# Patient Record
Sex: MALE | Race: WHITE
[De-identification: names, ages, dates, MRNs, and addresses within clinical notes are randomized per-mention and may not be internally consistent; named-entity substitution may affect disease eponyms.]

---

## 2017-12-13 ENCOUNTER — HOSPITAL ENCOUNTER (OUTPATIENT)
Dept: HOSPITAL 92 - CT | Age: 72
Discharge: HOME | End: 2017-12-13
Attending: INTERNAL MEDICINE
Payer: MEDICARE

## 2017-12-13 DIAGNOSIS — R91.1: Primary | ICD-10-CM

## 2017-12-13 DIAGNOSIS — J98.4: ICD-10-CM

## 2017-12-13 DIAGNOSIS — R91.8: ICD-10-CM

## 2017-12-13 DIAGNOSIS — J44.9: ICD-10-CM

## 2017-12-13 DIAGNOSIS — N20.0: ICD-10-CM

## 2017-12-13 PROCEDURE — 71260 CT THORAX DX C+: CPT

## 2017-12-13 NOTE — CT
CT THORAX WITH IV CONTRAST:

 

INDICATIONS:

Follow-up pulmonary nodules and shortness of breath.

 

COMPARISON:

Chest radiograph dated 08/02/2017.

 

FINDINGS:

The patchy parenchymal opacity seen within the right upper lobe on the comparison examination likely 
corresponds to an area of bronchiectasis and surrounding air space consolidation.  This may be relate
d to prior therapy changes within the right upper lobe with surrounding atelectasis and scarring.  Th
ere is severe emphysema.  There is some pleural parenchymal scarring involving the left lung apex.  T
here are numerous hypodensities involving the liver, suspicious for multiple small cysts.  There is a
 2.1 cm cyst involving the superior pole of the left kidney.  There are small, nonobstructing renal c
alculi bilaterally, within the visualized aspects of the kidneys.  There are small renal hypodensitie
s bilaterally, too small to characterize, but statistically also likely reflective of cysts.  There i
s partial visualization of an endovascular stent within the abdominal aorta.  The pancreas and spleen
 appear within normal limits.  The compression abnormalities involving the T8, T12, and T11 vertebral
 levels are similar appearing.  There are compression abnormalities involving T5, T6, and T8, of unde
termined chronicity.  There is diffuse osteopenia.

 

IMPRESSION:

1.  The spiculated density within the right suprahilar region on comparison chest radiograph correspo
nds to an area of bronchiectasis and surrounding consolidation or subsegmental atelectasis.  This can
 be seen in patients with Mycobacterium avium-intracellulare type infections.  This can also be relat
ed to prior therapy changes within the right upper lobe.  Recommend correlation.

 

2.  Severe chronic obstructive pulmonary disease.

 

3.  Suspected hepatic and renal cysts.

 

4.  Bilateral nephrolithiasis.

 

5.  Age indeterminate compression abnormalities of T8, T6, and T5.

 

6.  Chronic appearing compression abnormalities of T11 and T12.

 

POS: [facility]

## 2018-11-19 ENCOUNTER — HOSPITAL ENCOUNTER (OUTPATIENT)
Dept: HOSPITAL 92 - RAD | Age: 73
Discharge: HOME | End: 2018-11-19
Attending: INTERNAL MEDICINE
Payer: MEDICARE

## 2018-11-19 DIAGNOSIS — R06.00: Primary | ICD-10-CM

## 2018-11-19 DIAGNOSIS — R91.8: ICD-10-CM

## 2018-11-19 PROCEDURE — 71046 X-RAY EXAM CHEST 2 VIEWS: CPT

## 2018-11-19 NOTE — RAD
CHEST FRONTAL AND LATERAL IMAGIN2018

 

HISTORY:

Shortness of breath.

 

COMPARISON:

2017

 

FINDINGS:

Since the 2017 exam, there is a new focal opacity in the left lung base, with blunting of the l
eft costophrenic angle and patchy opacity extending to the left costophrenic angle.  There is vague i
ncreased density in the right lung apex as well, likely on the basis of volume loss and pleural thick
ening, when correlated with a 2017 chest CT.  The focal opacity in the left lung base was not s
een on the 2017 chest CT.  Heart and mediastinal contours appear grossly unremarkable.

 

The lungs are hyperinflated with increased linear interstitial density, suggesting COPD.  Incompletel
y imaged postoperative hardware overlies the lumbar spine.

 

IMPRESSION:

New focal opacity noted in the left base suggests infectious pneumonitis or aspiration of left lower 
lobe.

 

Recommend follow-up imaging, following treatment, to document resolution.

 

POS: [facility]

## 2018-12-11 ENCOUNTER — HOSPITAL ENCOUNTER (OUTPATIENT)
Dept: HOSPITAL 18 - NAV CT | Age: 73
Discharge: HOME | End: 2018-12-11
Payer: MEDICARE

## 2018-12-11 DIAGNOSIS — K44.9: ICD-10-CM

## 2018-12-11 DIAGNOSIS — J44.9: ICD-10-CM

## 2018-12-11 DIAGNOSIS — J43.2: ICD-10-CM

## 2018-12-11 DIAGNOSIS — R91.8: ICD-10-CM

## 2018-12-11 DIAGNOSIS — R91.1: Primary | ICD-10-CM

## 2018-12-11 DIAGNOSIS — J47.9: ICD-10-CM

## 2018-12-11 PROCEDURE — 71250 CT THORAX DX C-: CPT

## 2018-12-11 NOTE — CT
CT THORAX NONCONTRAST:

 

DATE: 

12-11-18

 

HISTORY:

72-year-old male for follow up diagnosis of MAC (mycobacterium-avium complex)

 

COMPARISON:

12-13-17

 

FINDINGS:

There is no interval change in the size of the region of consolidation involving the posterior segmen
t of the right upper lobe, broadly abutting the posterior pleural surface, and almost reaching the ri
ght apex. There is extensive air bronchogram with bronchiectasis within this. What appears to be a sm
all cavity could instead represent an especially ectatic bronchus, as it has not significantly change
d. There has been no interval change overall involving this lesion. 

 

In the lateral basilar segment of the left lower lobe, there is a new somewhat spiculated, approximat
ely 3 x 1.5 x 2.5 cm soft tissue density pulmonary lesion. 

 

There is another new finding of herniation of approximately 15-20% volume of the stomach into the low
er mediastinum. 

 

There is a now greater degree of what are probably chronic lace-like subpleural densities involving t
he lingual, than before. There are diffuse centrilobular emphysematous changes throughout both lungs.
 Trachea and bilateral mainstem bronchi are patent and clear. No mediastinal lymphadenopathy. No thor
acic aortic aneurysm. No pleural effusion or pneumothorax. Multiple low attenuation lesions in the le
ft lobe of the liver are again noted, consistent with multiple hepatic cysts. 

 

IMPRESSION:

1. Consolidation with air bronchogram with bronchiectasis involving posterior segment of right upper 
lobe is unchanged in one year. 

2. New spiculated mass-like density at the lateral basilar segment of left lower lobe. This could eit
her present a new infectious lesion or primary neoplasm. Follow up chest CT is strongly recommended i
n 1-3 months. 

3. New small to moderate sized sliding hiatal hernia. 

4. Moderate to severe centrilobular emphysema. 

 

Code T

 

JN R

 

POS: [facility]

## 2019-04-18 ENCOUNTER — HOSPITAL ENCOUNTER (OUTPATIENT)
Dept: HOSPITAL 18 - NAV CT | Age: 74
Discharge: HOME | End: 2019-04-18
Payer: MEDICARE

## 2019-04-18 DIAGNOSIS — R91.8: ICD-10-CM

## 2019-04-18 DIAGNOSIS — J44.9: Primary | ICD-10-CM

## 2019-04-18 DIAGNOSIS — J47.9: ICD-10-CM

## 2019-04-18 PROCEDURE — 71250 CT THORAX DX C-: CPT

## 2019-04-18 NOTE — CT
CT Chest WO Con



HISTORY:COPD. Pulmonary infiltrates. Mycobacterium avium intracellulare.



COMPARISON: 12/11/2018 study



FINDINGS: Severe emphysematous lung changes are seen within the upper lobes. The parenchymal changes 
within the posterior segment of the right upper lobe with associated bronchiectatic change is a

stable finding.



The left lower lobe parenchymal changes have resolved..



The scarring within the region of the lingula is stable. There are no new infiltrative changes presen
t.



A large hiatal hernia is present. Coronary artery calcifications are noted.



There is stable appearance to the low attenuation lesions within the liver probably related to cysts.
 Small hypodensity within the right kidney is also probably a small cyst and stable renal

calcifications on the right are noted which appear to be renal calculi. Left-sided calcifications yolanda
ear to be vascular in nature.



IMPRESSION:

1. Severe centrilobular and ischemic changes

2. Stable appearance to the right upper lobe parenchymal change and associated traction bronchiectasi
s.

3. Interval resolution of the left lower lobe parenchymal lung changes..



Reported By: Nilson Beverly 

Electronically Signed:  4/18/2019 10:15 AM

## 2020-01-29 ENCOUNTER — HOSPITAL ENCOUNTER (EMERGENCY)
Dept: HOSPITAL 92 - ERS | Age: 75
Discharge: HOME | End: 2020-01-29
Payer: MEDICARE

## 2020-01-29 DIAGNOSIS — J18.9: ICD-10-CM

## 2020-01-29 DIAGNOSIS — Z87.891: ICD-10-CM

## 2020-01-29 DIAGNOSIS — J44.1: Primary | ICD-10-CM

## 2020-01-29 DIAGNOSIS — Z79.899: ICD-10-CM

## 2020-01-29 DIAGNOSIS — Z79.51: ICD-10-CM

## 2020-01-29 DIAGNOSIS — E78.00: ICD-10-CM

## 2020-01-29 LAB
ALBUMIN SERPL BCG-MCNC: 3.2 G/DL (ref 3.4–4.8)
ALP SERPL-CCNC: 86 U/L (ref 40–110)
ALT SERPL W P-5'-P-CCNC: 17 U/L (ref 8–55)
ANION GAP SERPL CALC-SCNC: 13 MMOL/L (ref 10–20)
AST SERPL-CCNC: 21 U/L (ref 5–34)
BASOPHILS # BLD AUTO: 0 THOU/UL (ref 0–0.2)
BASOPHILS NFR BLD AUTO: 0.2 % (ref 0–1)
BILIRUB SERPL-MCNC: 0.4 MG/DL (ref 0.2–1.2)
BUN SERPL-MCNC: 11 MG/DL (ref 8.4–25.7)
CALCIUM SERPL-MCNC: 8.8 MG/DL (ref 7.8–10.44)
CHLORIDE SERPL-SCNC: 103 MMOL/L (ref 98–107)
CO2 SERPL-SCNC: 23 MMOL/L (ref 23–31)
CREAT CL PREDICTED SERPL C-G-VRATE: 0 ML/MIN (ref 70–130)
EOSINOPHIL # BLD AUTO: 0.1 THOU/UL (ref 0–0.7)
EOSINOPHIL NFR BLD AUTO: 0.6 % (ref 0–10)
GLOBULIN SER CALC-MCNC: 2.9 G/DL (ref 2.4–3.5)
GLUCOSE SERPL-MCNC: 108 MG/DL (ref 83–110)
HGB BLD-MCNC: 9.1 G/DL (ref 14–18)
LYMPHOCYTES # BLD: 0.8 THOU/UL (ref 1.2–3.4)
LYMPHOCYTES NFR BLD AUTO: 9.3 % (ref 21–51)
MCH RBC QN AUTO: 27.3 PG (ref 27–31)
MCV RBC AUTO: 88.8 FL (ref 78–98)
MONOCYTES # BLD AUTO: 1.3 THOU/UL (ref 0.11–0.59)
MONOCYTES NFR BLD AUTO: 14.3 % (ref 0–10)
NEUTROPHILS # BLD AUTO: 6.8 THOU/UL (ref 1.4–6.5)
NEUTROPHILS NFR BLD AUTO: 75.6 % (ref 42–75)
PLATELET # BLD AUTO: 308 THOU/UL (ref 130–400)
POTASSIUM SERPL-SCNC: 4.4 MMOL/L (ref 3.5–5.1)
RBC # BLD AUTO: 3.34 MILL/UL (ref 4.7–6.1)
SODIUM SERPL-SCNC: 135 MMOL/L (ref 136–145)
WBC # BLD AUTO: 8.9 THOU/UL (ref 4.8–10.8)

## 2020-01-29 PROCEDURE — 94640 AIRWAY INHALATION TREATMENT: CPT

## 2020-01-29 PROCEDURE — 83880 ASSAY OF NATRIURETIC PEPTIDE: CPT

## 2020-01-29 PROCEDURE — 84484 ASSAY OF TROPONIN QUANT: CPT

## 2020-01-29 PROCEDURE — 96374 THER/PROPH/DIAG INJ IV PUSH: CPT

## 2020-01-29 PROCEDURE — 85025 COMPLETE CBC W/AUTO DIFF WBC: CPT

## 2020-01-29 PROCEDURE — 80053 COMPREHEN METABOLIC PANEL: CPT

## 2020-01-29 PROCEDURE — 36415 COLL VENOUS BLD VENIPUNCTURE: CPT

## 2020-01-29 PROCEDURE — 71260 CT THORAX DX C+: CPT

## 2020-01-29 PROCEDURE — 93005 ELECTROCARDIOGRAM TRACING: CPT

## 2020-01-29 PROCEDURE — 83605 ASSAY OF LACTIC ACID: CPT

## 2020-01-29 PROCEDURE — 71046 X-RAY EXAM CHEST 2 VIEWS: CPT

## 2020-01-29 NOTE — CT
EXAM: CT of the chest with contrast



HISTORY: Dyspnea. Recent pneumonia diagnosis



COMPARISON: 4/18/2019



TECHNIQUE: Multiple contiguous axial images were obtained in a CT the chest with contrast. Coronal an
d sagittal reformats were performed.



FINDINGS:



HEART: Normal in size without focal cardiac abnormality

MEDIASTINUM: No hilar or mediastinal lymphadenopathy. There is a large hiatal hernia.



LUNGS: There is collapse and consolidation of the left upper lobe. There are hypodense regions within
 the collapsed lung measuring up to 3.0 cm in size which may represent areas of cavitation.

Consolidation and bronchiectasis are seen in the right upper lobe. Emphysematous changes are seen thr
oughout the lungs.

PLEURAL SPACE: Small left pleural effusion.



CHEST WALL SOFT TISSUES: Unremarkable

OSSEOUS STRUCTURES: Degenerative changes in the spine. Postsurgical changes in the spine.

VISUALIZED SUBDIAPHRAGMATIC STRUCTURES: Scattered subcentimeter hypodensities in the liver likely rep
resent cysts. There are cysts in the bilateral kidneys measuring up to 2.4 cm in size. A

nonobstructing 3 mm calcification is seen in each kidney.



IMPRESSION: 



1. Left upper lobe consolidation with areas of cavitation

2. Stable right upper lobe bronchiectasis with surrounding atelectasis

3. Small left pleural effusion

4. Large hiatal hernia

5. Hepatic and renal cysts

6. Nonobstructing bilateral renal calcifications



Reported By: Sherif Marshall 

Electronically Signed:  1/29/2020 2:13 PM

## 2020-01-29 NOTE — RAD
EXAM:

Chest 2 views:



HISTORY:

Dyspnea.



COMPARISON:

CT chest 4/18/2019



FINDINGS:

There is a normal-sized cardiomediastinal silhouette. There is opacity in the left lung which may rep
resent an infiltrate. Opacity in the right apex may represent an infiltrate or pleural thickening.

There may be a small left pleural effusion.  Degenerative changes are seen in the spine.



IMPRESSION:

Multifocal pneumonia



Reported By: Sherif Marshall 

Electronically Signed:  1/29/2020 12:15 PM

## 2020-02-06 ENCOUNTER — HOSPITAL ENCOUNTER (OUTPATIENT)
Dept: HOSPITAL 92 - RAD | Age: 75
Discharge: HOME | End: 2020-02-06
Attending: INTERNAL MEDICINE
Payer: MEDICARE

## 2020-02-06 DIAGNOSIS — R06.00: Primary | ICD-10-CM

## 2020-02-06 PROCEDURE — 71046 X-RAY EXAM CHEST 2 VIEWS: CPT

## 2020-02-06 NOTE — RAD
PA AND LATERAL CHEST:

 

Date:  02/06/2020

 

HISTORY:  

Dyspnea. 

 

COMPARISON:  

CT examination of 01/29/2020 and chest x-ray of same date. 

 

FINDINGS:

The parenchymal lung changes appear similar to the prior examination with the consolidated lung coello
es noted within the lingula and the right upper lobe pleural and parenchymal lung changes all appear 
stable. Hiatal hernia is again noted. 

 

IMPRESSION: 

Stable exam. 

 

 

[facility]: [facility]

## 2020-02-11 NOTE — HP
This is going to be an encounter that should be with a bronchoscopy.  The

bronchoscopy is being scheduled for February 12, 2020. 



SERVICE:  Pulmonary Medicine.



REASON FOR VISIT:  Abnormal chest x-ray.



HISTORY OF PRESENT ILLNESS:  The patient is a very pleasant 74-year-old white male

with past medical history significant for severe kyphoscoliosis and compression

fractures of the vertebra.  He has in his usual state of health until recently he

suffered a bout of pneumonia.  He had some fevers and chills and was bringing up

sputum.  He has put on some antibiotics.  Repeat imaging demonstrated significant

improvement in the infiltrate, but it certainly did not go away.  Repeat imaging

performed a couple weeks later demonstrated persistence and changes prompting a CT

of the chest.  This showed complete atelectasis of the left upper lobe.  He denies

any current fevers, chills, nausea, vomiting, or diarrhea at this point.  He does

have more dyspnea on exertion than he experiences at baseline, but it is not severe.

 Because of this, he was sent back here. 



Previous imaging from about 8 months ago did not demonstrate any significant

problems here. 



PAST MEDICAL HISTORY:  

1. COPD.

2. Hypertension.

3. Dyslipidemia.

4. Rhinitis secondary to allergic and nonallergic causes.

5. Compressions fractions of the vertebra.

6. History of histoplasma of the right upper lobe, status post therapy, now with

chronic changes there. 

7. History of positive Mycobacterium avium complex sputum cultures, but never

received therapy historically. 

8. Chronic diastolic heart failure.



PAST SURGICAL HISTORY:  

1. Appendectomy.

2. Tonsillectomy.

3. Abdominal aortic aneurysm repair, endovascular.

4. Right inguinal hernia repair.



FAMILY HISTORY:  Positive for heart disease, COPD, and alcoholism.



SOCIAL HISTORY:  He drinks roughly 1 beer per day on average.  He has a 60-pack-year

history of smoking, though he is not currently doing this.  He quit well over 10

years ago.  He denies any street drugs and has no exposure to chemicals, dust,

asbestos, or tuberculosis. 



ALLERGIES:  PENICILLIN.



MEDICATIONS:  

1. Anoro Ellipta 62.5/25 one inhalation daily.

2. ProAir 90 mcg two puffs inhaled q.4 hours as needed (using roughly once per

month). 

3. Propranolol 20 mg p.o. b.i.d.

4. Zetia 10 mg p.o. daily.

5. Rosuvastatin 5 mg p.o. daily.

6. Tamsulosin 0.4 mg p.o. b.i.d.

7. Protonix 40 mg p.o. b.i.d.

8. Benadryl 25 mg p.o. as needed.

9. Multivitamin one tab p.o. daily.

10. Melatonin 3 mg p.o. at bedtime p.r.n.

11. Nasacort 2 puffs inhaled each nostril daily.

12. Tylenol 500 mg p.o. q.6 hours as needed (max 3 per day).

13. DuoNeb nebulized q.6 hours as needed.



REVIEW OF SYSTEMS:  General; head, ears, eyes, nose, throat; cardiovascular;

respiratory; GI; ; musculoskeletal; neurologic; and skin are negative except as

mentioned in the HPI. 



PHYSICAL EXAMINATION:

VITAL SIGNS:  Afebrile, pulse 67, respirations 19, saturation 98% on room air. 

GENERAL:  The patient is awake and alert, in no apparent distress. 

LUNGS:  Wonderful air entry.  Dependent crackles are minimal.  No prolonged

expiratory phase or wheezing appreciated. 

HEART:  Normal rate and regular. 

ABDOMEN:  Soft, nontender, and nondistended.  Bowel sounds are positive. 

MUSCULOSKELETAL:  No cyanosis or clubbing.  There is 1 to 2+ pitting in the

bilateral lower extremities. 

NEUROLOGIC:  Grossly nonfocal.



IMAGING DATA:  CT of the chest demonstrates complete atelectasis of the left upper

lobe.  There are chronic changes in the right upper lobe. 



ASSESSMENT:  

1. Chronic obstructive pulmonary disease/emphysema.

2. Restrictive lung disease secondary to severe kyphoscoliosis.

3. History of histoplasma of the right upper lobe, status post therapy, now with

chronic changes there including focal bronchiectasis. 

4. History of positive Mycobacterium avium complex by sputum culture, historically

never received therapy. 

5. Chronic diastolic heart failure.

6. Community-acquired pneumonia, recent with near improvement in the infiltrate.

7. Atelectasis of the entirety of the left upper lobe.



DISCUSSION AND PLAN:  On the CT scan, I do not see any discrete endobronchial

disease in the left upper lobe, but an investigation will need to be conducted here.

 These changes are new within the last year, and I would like to make certain there

is no debris, mucus plug, or endobronchial growth that needs to be liberated.  We

will arrange for him to undergo a bronchoscopy for both diagnostic and hopefully

therapeutic purposes.  This has been scheduled for the 12th of February at roughly

noon.  I will have him return to clinic roughly 6 weeks after this bronchoscopy can

be performed. 







Job ID:  [id number]

## 2020-02-12 ENCOUNTER — HOSPITAL ENCOUNTER (OUTPATIENT)
Dept: HOSPITAL 92 - SDC | Age: 75
Discharge: HOME | End: 2020-02-12
Attending: INTERNAL MEDICINE
Payer: MEDICARE

## 2020-02-12 VITALS — BODY MASS INDEX: 25 KG/M2

## 2020-02-12 DIAGNOSIS — J30.9: ICD-10-CM

## 2020-02-12 DIAGNOSIS — I50.32: ICD-10-CM

## 2020-02-12 DIAGNOSIS — E78.5: ICD-10-CM

## 2020-02-12 DIAGNOSIS — Z79.899: ICD-10-CM

## 2020-02-12 DIAGNOSIS — Z88.2: ICD-10-CM

## 2020-02-12 DIAGNOSIS — I11.0: ICD-10-CM

## 2020-02-12 DIAGNOSIS — Z88.0: ICD-10-CM

## 2020-02-12 DIAGNOSIS — J16.8: ICD-10-CM

## 2020-02-12 DIAGNOSIS — J43.9: ICD-10-CM

## 2020-02-12 DIAGNOSIS — Z87.891: ICD-10-CM

## 2020-02-12 DIAGNOSIS — J98.11: Primary | ICD-10-CM

## 2020-02-12 LAB
NEUTROPHILS NFR FLD: 76 %
NEUTROPHILS NFR FLD: 77 %
NONHEMATIC CELLS NFR FLD MANUAL: 15 %
NONHEMATIC CELLS NFR FLD MANUAL: 19 %
RBC # FLD AUTO: (no result) /CUMM
RBC # FLD AUTO: 3450 /CUMM
WBC # FLD MANUAL: 598 /CUMM
WBC # FLD MANUAL: 950 /CUMM

## 2020-02-12 PROCEDURE — 87205 SMEAR GRAM STAIN: CPT

## 2020-02-12 PROCEDURE — 0B9G8ZX DRAINAGE OF LEFT UPPER LUNG LOBE, VIA NATURAL OR ARTIFICIAL OPENING ENDOSCOPIC, DIAGNOSTIC: ICD-10-PCS | Performed by: INTERNAL MEDICINE

## 2020-02-12 PROCEDURE — 0BB88ZX EXCISION OF LEFT UPPER LOBE BRONCHUS, VIA NATURAL OR ARTIFICIAL OPENING ENDOSCOPIC, DIAGNOSTIC: ICD-10-PCS | Performed by: INTERNAL MEDICINE

## 2020-02-12 PROCEDURE — 88305 TISSUE EXAM BY PATHOLOGIST: CPT

## 2020-02-12 PROCEDURE — 0B9C8ZX DRAINAGE OF RIGHT UPPER LUNG LOBE, VIA NATURAL OR ARTIFICIAL OPENING ENDOSCOPIC, DIAGNOSTIC: ICD-10-PCS | Performed by: INTERNAL MEDICINE

## 2020-02-12 PROCEDURE — 87102 FUNGUS ISOLATION CULTURE: CPT

## 2020-02-12 PROCEDURE — 87206 SMEAR FLUORESCENT/ACID STAI: CPT

## 2020-02-12 PROCEDURE — 87116 MYCOBACTERIA CULTURE: CPT

## 2020-02-12 PROCEDURE — 87070 CULTURE OTHR SPECIMN AEROBIC: CPT

## 2020-02-12 PROCEDURE — 88112 CYTOPATH CELL ENHANCE TECH: CPT

## 2020-02-12 PROCEDURE — 85060 BLOOD SMEAR INTERPRETATION: CPT

## 2020-02-12 PROCEDURE — 89051 BODY FLUID CELL COUNT: CPT

## 2020-02-12 PROCEDURE — 0BBG8ZX EXCISION OF LEFT UPPER LUNG LOBE, VIA NATURAL OR ARTIFICIAL OPENING ENDOSCOPIC, DIAGNOSTIC: ICD-10-PCS | Performed by: INTERNAL MEDICINE

## 2020-02-12 NOTE — OP
DATE OF PROCEDURE:  02/12/2020



SERVICE:  Pulmonary Medicine.



PROCEDURES PERFORMED:  Fiberoptic bronchoscopy with:

1. Visual airway inspection.

2. BAL from the right upper lobe and left upper lobe.

3. Endobronchial biopsies from the left upper lobe.

4. Transbronchial biopsies from the left upper lobe.



PREPROCEDURE DIAGNOSES:  

1. Atelectasis of the left upper lobe.

2. Pulmonary infiltrates.



POSTPROCEDURE DIAGNOSES:  

1. Atelectasis of the left upper lobe.

2. Pulmonary infiltrates.



ANESTHESIA:  General.



PREANESTHESIA ASSESSMENT:  H and P had been performed.  The patient's 
medications

and allergies were reviewed.  Informed consent was obtained after discussing 
risks,

benefits, and rationale for performing the procedure as well as alternative 
options. 



DESCRIPTION OF PROCEDURE:  Time-out was performed identifying the correct 
procedure

and patient with name, date of birth.  A diagnostic fiberoptic bronchoscope was

introduced through the 8.0 endotracheal tube.  It was advanced into the trachea,

where tracheobronchial tree inspection was carried out with clear 
identification of

the right upper lobe, right middle lobe, right lower lobe, left upper lobe, 
lingula,

and left lower lobe.  Anatomy was normal to the segmental level, though there 
was

significant friability of mucosal membranes from the right upper lobe and the 
left

upper lobe.  There was significant inflammatory process happening in the left 
upper

lobe to the point where most of the airways throughout the lingula and left 
upper

lobe were friable and partially stenotic, though I could not see any obvious

endobronchial disease identified.  A BAL was obtained from the right upper lobe 
for

microbiology.  A BAL was also obtained from the left upper lobe for both 
pathology

and microbiology.  Endobronchial biopsies were obtained from the left upper 
lobe in

the secondary and tertiary carinas that were significantly abnormal from an

inflammatory standpoint.  I also performed transbronchial biopsies under

fluoroscopic guidance from the left upper lobe throughout the lingula, and left

upper lobe.  Hemostasis was verified and the bronchoscope was subsequently 
removed

from the patient.  Postprocedure fluoroscopy did not demonstrate a 
pneumothorax. 



FINDINGS:  

1. Significant friability in the right upper lobe and left upper lobe.  20% to 
30%

stenosis of most of the segments throughout the lingula and left upper lobe, 
though

the bronchoscope could pass into each segment. 

2. Secretions were moderate, thick, and purulent.

3. Mucus plugs were liberated throughout the left upper lobe.



SPECIMENS OBTAINED:  

1. Pathology on BAL from left upper lobe, endobronchial biopsy and 
transbronchial

biopsies of left upper lobe. 

2. Microbiology on BAL from left upper lobe and BAL from right upper lobe.



COMPLICATIONS:  None.



ESTIMATED BLOOD LOSS:  5 mL.



FLUOROSCOPY TIME:  Less than 1 minute.



DISPOSITION:  The patient will be discharged home with postprocedure 
instructions

once he meets criteria in the postanesthesia care unit. 







Job ID:  [id number]



MTDD

## 2020-02-19 ENCOUNTER — HOSPITAL ENCOUNTER (OUTPATIENT)
Dept: HOSPITAL 92 - SLEEPLAB | Age: 75
Discharge: HOME | End: 2020-02-19
Attending: INTERNAL MEDICINE
Payer: MEDICARE

## 2020-02-19 DIAGNOSIS — I10: ICD-10-CM

## 2020-02-19 DIAGNOSIS — E66.9: ICD-10-CM

## 2020-02-19 DIAGNOSIS — R06.83: ICD-10-CM

## 2020-02-19 DIAGNOSIS — G47.33: Primary | ICD-10-CM

## 2020-02-19 DIAGNOSIS — R51: ICD-10-CM

## 2020-02-19 DIAGNOSIS — R35.1: ICD-10-CM

## 2020-02-19 PROCEDURE — 95811 POLYSOM 6/>YRS CPAP 4/> PARM: CPT

## 2020-03-11 ENCOUNTER — HOSPITAL ENCOUNTER (INPATIENT)
Dept: HOSPITAL 92 - ERS | Age: 75
LOS: 5 days | Discharge: HOME | DRG: 177 | End: 2020-03-16
Attending: INTERNAL MEDICINE | Admitting: INTERNAL MEDICINE
Payer: MEDICARE

## 2020-03-11 VITALS — BODY MASS INDEX: 24.5 KG/M2

## 2020-03-11 DIAGNOSIS — J96.01: ICD-10-CM

## 2020-03-11 DIAGNOSIS — Z87.891: ICD-10-CM

## 2020-03-11 DIAGNOSIS — J90: ICD-10-CM

## 2020-03-11 DIAGNOSIS — I50.32: ICD-10-CM

## 2020-03-11 DIAGNOSIS — Z88.0: ICD-10-CM

## 2020-03-11 DIAGNOSIS — G89.29: ICD-10-CM

## 2020-03-11 DIAGNOSIS — E44.1: ICD-10-CM

## 2020-03-11 DIAGNOSIS — G47.33: ICD-10-CM

## 2020-03-11 DIAGNOSIS — K44.9: ICD-10-CM

## 2020-03-11 DIAGNOSIS — D72.829: ICD-10-CM

## 2020-03-11 DIAGNOSIS — D64.9: ICD-10-CM

## 2020-03-11 DIAGNOSIS — E87.1: ICD-10-CM

## 2020-03-11 DIAGNOSIS — J69.0: Primary | ICD-10-CM

## 2020-03-11 DIAGNOSIS — Z88.6: ICD-10-CM

## 2020-03-11 DIAGNOSIS — J44.1: ICD-10-CM

## 2020-03-11 LAB
ALBUMIN SERPL BCG-MCNC: 2.8 G/DL (ref 3.4–4.8)
ALP SERPL-CCNC: 91 U/L (ref 40–110)
ALT SERPL W P-5'-P-CCNC: 39 U/L (ref 8–55)
ANION GAP SERPL CALC-SCNC: 14 MMOL/L (ref 10–20)
AST SERPL-CCNC: 31 U/L (ref 5–34)
BASOPHILS # BLD AUTO: 0 THOU/UL (ref 0–0.2)
BASOPHILS NFR BLD AUTO: 0.1 % (ref 0–1)
BILIRUB SERPL-MCNC: 0.3 MG/DL (ref 0.2–1.2)
BUN SERPL-MCNC: 13 MG/DL (ref 8.4–25.7)
CALCIUM SERPL-MCNC: 8.5 MG/DL (ref 7.8–10.44)
CHLORIDE SERPL-SCNC: 101 MMOL/L (ref 98–107)
CO2 SERPL-SCNC: 23 MMOL/L (ref 23–31)
CREAT CL PREDICTED SERPL C-G-VRATE: 0 ML/MIN (ref 70–130)
EOSINOPHIL # BLD AUTO: 0 THOU/UL (ref 0–0.7)
EOSINOPHIL NFR BLD AUTO: 0.3 % (ref 0–10)
GLOBULIN SER CALC-MCNC: 2.7 G/DL (ref 2.4–3.5)
GLUCOSE SERPL-MCNC: 123 MG/DL (ref 83–110)
HGB BLD-MCNC: 7.6 G/DL (ref 14–18)
LYMPHOCYTES # BLD: 0.7 THOU/UL (ref 1.2–3.4)
LYMPHOCYTES NFR BLD AUTO: 5 % (ref 21–51)
MCH RBC QN AUTO: 25.7 PG (ref 27–31)
MCV RBC AUTO: 82 FL (ref 78–98)
MONOCYTES # BLD AUTO: 1.5 THOU/UL (ref 0.11–0.59)
MONOCYTES NFR BLD AUTO: 10.6 % (ref 0–10)
NEUTROPHILS # BLD AUTO: 11.9 THOU/UL (ref 1.4–6.5)
NEUTROPHILS NFR BLD AUTO: 84 % (ref 42–75)
PLATELET # BLD AUTO: 454 THOU/UL (ref 130–400)
POTASSIUM SERPL-SCNC: 4.5 MMOL/L (ref 3.5–5.1)
RBC # BLD AUTO: 2.94 MILL/UL (ref 4.7–6.1)
SODIUM SERPL-SCNC: 133 MMOL/L (ref 136–145)
TROPONIN I SERPL DL<=0.01 NG/ML-MCNC: 0.02 NG/ML (ref ?–0.03)
TROPONIN I SERPL DL<=0.01 NG/ML-MCNC: 0.02 NG/ML (ref ?–0.03)
WBC # BLD AUTO: 14.1 THOU/UL (ref 4.8–10.8)

## 2020-03-11 PROCEDURE — 80048 BASIC METABOLIC PNL TOTAL CA: CPT

## 2020-03-11 PROCEDURE — 93005 ELECTROCARDIOGRAM TRACING: CPT

## 2020-03-11 PROCEDURE — 80053 COMPREHEN METABOLIC PANEL: CPT

## 2020-03-11 PROCEDURE — 83986 ASSAY PH BODY FLUID NOS: CPT

## 2020-03-11 PROCEDURE — 85027 COMPLETE CBC AUTOMATED: CPT

## 2020-03-11 PROCEDURE — 86698 HISTOPLASMA ANTIBODY: CPT

## 2020-03-11 PROCEDURE — 84100 ASSAY OF PHOSPHORUS: CPT

## 2020-03-11 PROCEDURE — 89051 BODY FLUID CELL COUNT: CPT

## 2020-03-11 PROCEDURE — 83615 LACTATE (LD) (LDH) ENZYME: CPT

## 2020-03-11 PROCEDURE — 84157 ASSAY OF PROTEIN OTHER: CPT

## 2020-03-11 PROCEDURE — 96367 TX/PROPH/DG ADDL SEQ IV INF: CPT

## 2020-03-11 PROCEDURE — 96365 THER/PROPH/DIAG IV INF INIT: CPT

## 2020-03-11 PROCEDURE — 93306 TTE W/DOPPLER COMPLETE: CPT

## 2020-03-11 PROCEDURE — 71045 X-RAY EXAM CHEST 1 VIEW: CPT

## 2020-03-11 PROCEDURE — 82274 ASSAY TEST FOR BLOOD FECAL: CPT

## 2020-03-11 PROCEDURE — 87899 AGENT NOS ASSAY W/OPTIC: CPT

## 2020-03-11 PROCEDURE — 87205 SMEAR GRAM STAIN: CPT

## 2020-03-11 PROCEDURE — 83550 IRON BINDING TEST: CPT

## 2020-03-11 PROCEDURE — 83880 ASSAY OF NATRIURETIC PEPTIDE: CPT

## 2020-03-11 PROCEDURE — 87385 HISTOPLASMA CAPSUL AG IA: CPT

## 2020-03-11 PROCEDURE — 87633 RESP VIRUS 12-25 TARGETS: CPT

## 2020-03-11 PROCEDURE — 96375 TX/PRO/DX INJ NEW DRUG ADDON: CPT

## 2020-03-11 PROCEDURE — 87798 DETECT AGENT NOS DNA AMP: CPT

## 2020-03-11 PROCEDURE — 86612 BLASTOMYCES ANTIBODY: CPT

## 2020-03-11 PROCEDURE — 88112 CYTOPATH CELL ENHANCE TECH: CPT

## 2020-03-11 PROCEDURE — 87070 CULTURE OTHR SPECIMN AEROBIC: CPT

## 2020-03-11 PROCEDURE — 87449 NOS EACH ORGANISM AG IA: CPT

## 2020-03-11 PROCEDURE — 71275 CT ANGIOGRAPHY CHEST: CPT

## 2020-03-11 PROCEDURE — 85025 COMPLETE CBC W/AUTO DIFF WBC: CPT

## 2020-03-11 PROCEDURE — 80202 ASSAY OF VANCOMYCIN: CPT

## 2020-03-11 PROCEDURE — 85060 BLOOD SMEAR INTERPRETATION: CPT

## 2020-03-11 PROCEDURE — 87206 SMEAR FLUORESCENT/ACID STAI: CPT

## 2020-03-11 PROCEDURE — 36415 COLL VENOUS BLD VENIPUNCTURE: CPT

## 2020-03-11 PROCEDURE — 82728 ASSAY OF FERRITIN: CPT

## 2020-03-11 PROCEDURE — 86635 COCCIDIOIDES ANTIBODY: CPT

## 2020-03-11 PROCEDURE — 94640 AIRWAY INHALATION TREATMENT: CPT

## 2020-03-11 PROCEDURE — 84484 ASSAY OF TROPONIN QUANT: CPT

## 2020-03-11 PROCEDURE — 83735 ASSAY OF MAGNESIUM: CPT

## 2020-03-11 PROCEDURE — 87116 MYCOBACTERIA CULTURE: CPT

## 2020-03-11 PROCEDURE — 83540 ASSAY OF IRON: CPT

## 2020-03-11 PROCEDURE — 82945 GLUCOSE OTHER FLUID: CPT

## 2020-03-11 PROCEDURE — 88305 TISSUE EXAM BY PATHOLOGIST: CPT

## 2020-03-11 NOTE — CT
CT PULMONARY ANGIOGRAM WITH IV CONTRAST AND 3D POSTPROCESSING:

 

HISTORY: 

Dyspnea, cough.

 

COMPARISON: 

CT chest with IV contrast dated 1/29/2020.

 

FINDINGS: 

There is contrast opacification of the pulmonary arterial vasculature without filling defect to sugge
st pulmonary embolism.  The thoracic aorta is well opacified without aneurysm or dissection.  Vascula
r calcifications are present.  

 

Consolidation of the left upper lobe is again seen.  There is interval mild increase in size of the l
eft pleural effusion.  Consolidation and bronchiectatic changes in the right upper lobe are redemonst
rated.  Emphysematous changes in the lung fields bilaterally are redemonstrated.  Mediastinal lymphad
enopathy is again seen.  Large hiatal hernia is redemonstrated.

 

Upper abdominal tomograms redemonstrate multiple liver and renal cysts and tiny non obstructing renal
 calculi.  There are degenerative changes in the spine.

 

IMPRESSION: 

No CT evidence of pulmonary embolism.

 

POS: [facility]

## 2020-03-11 NOTE — RAD
XR Chest 1 View Portable



History: Shortness of breath



Comparison: Radiograph February 6, 2020



Findings: Enlarging left layering pleural effusion. Large sliding hiatal hernia. Progressive loss of 
normal parenchymal aeration of the lingula and left upper lobe.



Volume loss and bronchiectasis in the right upper lobe with peripheral scarring.



Impression: 

1. Enlarging left layering pleural effusion.

2. Progressive loss of aeration lingula and left upper lobe. Repeat CT examination recommended. Under
lying mass is a concern.

3. Large sliding hiatal hernia.



Reported By: Theo Bonner 

Electronically Signed:  3/11/2020 11:39 AM

## 2020-03-11 NOTE — HP
PRIMARY CARE PHYSICIAN:  Dr. Garces.



REASON FOR ADMISSION:  Acute respiratory failure with hypoxia.



HISTORY OF PRESENT ILLNESS:  A 74-year-old male, who has underlying history of COPD,

who presented to emergency room with increasing shortness of breath.  The patient

has underlying history of severe kyphoscoliosis and compression fracture of

vertebra.  The patient also has underlying history of COPD.  The patient has history

of 52 years of smoking, but he quit smoking about 12 to 13 years ago.  The patient

was not requiring any oxygen at home. 



The patient was evaluated by Pulmonary team in February 2020.  At that time, the

patient had bronchoscopy done and bronchoscopy biopsy report was negative for any

malignancy. 



The patient was gradually declining for the last 2 to 3 weeks.  The patient has

increasing shortness of breath and cough.  The patient reports that he feels

rattling, but the cough does not come out.  He has increasing shortness of breath

and his physical capacity reduced.  Even after walking, he gets out of breath.  He

denies any fever or chills.  He denies any hemoptysis.  He denies any pleurisy.  He

denies any pleuritic chest pain.  He denies any recent travel or flu-like illness or

sick exposure.  The patient is up-to-date in flu and pneumonia vaccination. 



Today, he came to emergency room because he was having difficulty breathing and he

was saturating only 87% on room air.  He was appeared in respiratory distress and

that is why we decided to keep this patient in the hospital.  The patient reports

that he has increasing bilateral lower extremity swelling for last several days. 



REVIEW OF SYSTEMS:  CONSTITUTIONAL:  Negative for weight loss or gain, ability to

conduct usual activities. 

SKIN:  Negative for rash, itching. 

EYES:  Negative for double vision, pain. 

ENT/MOUTH:  Negative for nose bleeding, neck stiffness, pain, tenderness. 

CARDIOVASCULAR:  Negative for palpitations, dyspnea on exertion, orthopnea. 

RESPIRATORY:  Negative for shortness of breath, wheezing, cough, hemoptysis, fever

or night sweats. 

GASTROINTESTINAL:  Negative for poor appetite, abdominal pain, heartburn, nausea,

vomiting, constipation, or diarrhea. 

GENITOURINARY:  Negative for urgency, frequency, dysuria, nocturia. 

MUSCULOSKELETAL:  Negative for pain, swelling. 

NEUROLOGIC/PSYCHIATRIC:  Negative for anxiety, depression. 

ALLERGY/IMMUNOLOGIC:  Negative for skin rash, bleeding tendency. 



Please see my HPI for pertinent positives and negatives.  All other review of

systems are reviewed and negative except as mentioned in HPI. 



PAST MEDICAL HISTORY:  COPD, dyslipidemia, kyphoscoliosis, chronic low back pain,

physical deconditioning, ex-smoker. 



PAST SURGICAL HISTORY:  Back surgery, appendicectomy, tonsillectomy, hernia repair,

bronchoscopy. 



PAST PSYCHIATRIC HISTORY:  Reviewed and negative.



SOCIAL HISTORY:  The patient has history of some alcohol use socially.  He was

former smoker about 52 pack years history and currently quit for last 10 to 15

years.  He lives at home with his wife. 



FAMILY HISTORY:  No strong family history of premature coronary artery disease,

stroke, or cancer. 



ALLERGIES:  MORPHINE AND PENICILLIN.



CURRENT HOME MEDICATIONS:  

1. Levaquin 750 mg daily.

2. Albuterol inhaler on as needed basis.

3. Protonix 40 mg daily.

4. Crestor 5 mg daily.

5. Anoro Ellipta inhalation daily.

6. Benadryl as needed.

7. Propranolol 20 mg twice daily.

8. Zetia 10 mg daily.

9. Flomax 0.4 mg twice daily.

10. Nasacort once a day.

11. DuoNebs q.6 hourly.



EMERGENCY ROOM COURSE:  The patient received azithromycin, Rocephin, and Lasix.



PHYSICAL EXAMINATION:

VITAL SIGNS:  Blood pressure 115/55, pulse 75, respiratory rate 24, temperature

98.6, saturation 95% on 2 L oxygen.  Weight 72.5 kg. 

GENERAL:  The patient is currently alert, awake, chronically ill, in no obvious

acute distress. 

HEENT:  Head, normocephalic 

NECK:  Supple.  No JVD.  No meningeal signs of irritation. 

LUNGS:  Bilateral scattered rales noted.  Bilateral end-expiratory wheezing heard.

Air entry reduced.  Respiratory rate increased. 

CARDIAC:  S1 and S2 regular.  No murmur.  No gallop.  No rub. 

ABDOMEN:  Soft.  Bowel sounds present, nontender, and nondistended.  No

organomegaly.  No mass. 

EXTREMITIES:  Bilateral lower extremity edema noted.  Good distal pulsation. 

SKIN:  No skin rash. 

HEMATOLOGICAL SYSTEM:  No lymphadenopathy. 

NEUROLOGIC:  Nonfocal examination.



DIAGNOSTIC STUDIES:  EKG showing normal sinus rhythm without any acute ischemic

changes. 



Chest x-ray reported as left pleural effusion, progressive loss of lingula and left

upper lobe, large sliding hiatal hernia.  CT angiography report is pending. 



CBC; WBC 14.1, hemoglobin 7.6, platelet 454.  BMP; sodium 133, potassium 4.5,

chloride 101, carbon dioxide 23, BUN 13, creatinine 0.60, glucose 123, calcium 8.5. 



LFT; AST 31, ALT 39, alkaline phosphatase 91, albumin 2.8.  Troponin 0.010.  BNP

57.5. 



ASSESSMENT AND PLAN:  

1. Acute respiratory failure with hypoxia, likely due to underlying chronic

obstructive pulmonary disease exacerbation and suspected pneumonia. 

2. Left pleural effusion with suspected pneumonia.  The patient will be given

Rocephin and azithromycin.  Pulmonary will be consulted. 

3. Chronic obstructive pulmonary disease exacerbation.  We will continue with DuoNeb

q.4 hours hourly, Pulmicort nebulization twice daily, and Solu-Medrol 40 mg IV q.8

hourly.  We will check influenza screen, urine streptococcal and Legionella antigen.

 Pulmonary will be consulted. 

4. Bilateral lower extremity edema.  We will obtain echocardiography to assess

ejection fraction and other structural abnormality. 

5. Mild protein-calorie malnutrition. The patient will be given regular diet and

nutritional support. 

6. History of sleep apnea.  We will continue CPAP machine during nighttime.

7. Dyslipidemia.  Continue Crestor as per home dosage.

8. Anemia.  We will continue ferrous sulfate 325 mg p.o. b.i.d. and we will check

anemia workup. 

9. Deep venous thrombosis prophylaxis.  Lovenox 30 mg subcu daily.

10. Gastrointestinal prophylaxis.  Protonix 40 mg p.o. daily.

11. Code status, the patient is full code.  The patient's wife is surrogate decision

maker. 



DISPOSITION PLAN:  Based on clinical course, we are expecting the patient's stay in

hospital more than 2 midnights.  Plan of care discussed with the patient in detail.

We will also continue with PT, OT, and discharge planning. 







Job ID:  [id number]

## 2020-03-12 LAB
ANION GAP SERPL CALC-SCNC: 10 MMOL/L (ref 10–20)
BASOPHILS # BLD AUTO: 0 THOU/UL (ref 0–0.2)
BASOPHILS NFR BLD AUTO: 0 % (ref 0–1)
BUN SERPL-MCNC: 11 MG/DL (ref 8.4–25.7)
CALCIUM SERPL-MCNC: 8.8 MG/DL (ref 7.8–10.44)
CHLORIDE SERPL-SCNC: 101 MMOL/L (ref 98–107)
CO2 SERPL-SCNC: 25 MMOL/L (ref 23–31)
CREAT CL PREDICTED SERPL C-G-VRATE: 112 ML/MIN (ref 70–130)
EOSINOPHIL # BLD AUTO: 0 THOU/UL (ref 0–0.7)
EOSINOPHIL NFR BLD AUTO: 0.1 % (ref 0–10)
GLUCOSE SERPL-MCNC: 166 MG/DL (ref 83–110)
HGB BLD-MCNC: 7.4 G/DL (ref 14–18)
IRON SERPL-MCNC: 8 UG/DL (ref 65–175)
LYMPHOCYTES # BLD: 0.4 THOU/UL (ref 1.2–3.4)
LYMPHOCYTES NFR BLD AUTO: 3.6 % (ref 21–51)
MCH RBC QN AUTO: 25.2 PG (ref 27–31)
MCV RBC AUTO: 81.5 FL (ref 78–98)
MONOCYTES # BLD AUTO: 0.1 THOU/UL (ref 0.11–0.59)
MONOCYTES NFR BLD AUTO: 1.1 % (ref 0–10)
NEUTROPHILS # BLD AUTO: 11.8 THOU/UL (ref 1.4–6.5)
NEUTROPHILS NFR BLD AUTO: 95.2 % (ref 42–75)
PLATELET # BLD AUTO: 410 THOU/UL (ref 130–400)
POTASSIUM SERPL-SCNC: 4 MMOL/L (ref 3.5–5.1)
RBC # BLD AUTO: 2.92 MILL/UL (ref 4.7–6.1)
S PNEUM AG UR QL: NEGATIVE
SODIUM SERPL-SCNC: 132 MMOL/L (ref 136–145)
UIBC SERPL-MCNC: 174 MCG/DL (ref 261–462)
WBC # BLD AUTO: 12.3 THOU/UL (ref 4.8–10.8)

## 2020-03-12 RX ADMIN — THERA TABS SCH TAB: TAB at 07:33

## 2020-03-12 RX ADMIN — DOCUSATE SODIUM 50 MG AND SENNOSIDES 8.6 MG PRN TAB: 8.6; 5 TABLET, FILM COATED ORAL at 13:15

## 2020-03-12 RX ADMIN — MEROPENEM AND SODIUM CHLORIDE SCH MLS: 1 INJECTION, SOLUTION INTRAVENOUS at 23:18

## 2020-03-12 NOTE — CON
DATE OF CONSULTATION:  03/12/2020



SERVICE:  Pulmonary Medicine.



REASON FOR CONSULTATION:  Abnormal CT findings.



HISTORY OF PRESENT ILLNESS:  The patient is a 74-year-old white male with past

medical history significant for oropharyngeal dysphagia and some weakness.  He 
has

had a difficult go recently with multiple presentations to the hospital for

recurrent pneumonias.  It is likely associated with aspiration related disease 
from

severe acid reflux disease and hiatal hernia.  He exclusively sleeps on his

left-hand side.  As such, all night long material from his stomach and esophagus

spilled down into the left lung in the superior segment.  On presenting to the

hospital, he had increasing work of breathing, shortness of breath, and cough.  
He

is bringing up brown material.  He was having some fevers.  As such, he 
presented to

the emergency department.  He was put on some antibiotics.  Since being on these

antibiotics, he has had a significant improvement in symptoms.  Recently, we 
were

able to conduct a sleep study, which demonstrated he had moderate sleep apnea.  
This

is likely contributing to his underlying presentation.  He was about to get an 
EGD

in the outpatient setting early next week, but unfortunately, this will be

postponed.  Otherwise, there has been no interval change to his condition. 



PAST MEDICAL HISTORY:  

1. Hypertension.

2. Dyslipidemia.

3. Obstructive sleep apnea, moderate.

4. COPD.

5. Rhinitis secondary to allergic and nonallergic issues.

6. Compression fractures of the vertebra.

7. History of histoplasmosis of the right upper lobe, status post therapy, now 
with

chronic changes there. 

8. 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

historically received therapy, though recent BAL was negative. 

9. Chronic diastolic heart failure.



PAST SURGICAL HISTORY:  

1. Appendectomy.

2. Tonsillectomy.

3. Abdominal aortic aneurysm repair endovascular.

4. Right inguinal hernia repair.



FAMILY HISTORY:  Noncontributory.



SOCIAL HISTORY:  He drinks a beer on average per day.  He has over 60-pack-year

history of smoking, but he quit well over 10 years ago.  Denies any street 
drugs or

illicits.  He has no exposure to chemicals, dust, asbestos, or tuberculosis at 
this

point. 



ALLERGIES:  PENICILLIN.



MEDICATIONS:  List of his inpatient medications was reviewed.  No specific 
updates

were made at this time. 



REVIEW OF SYSTEMS:  General; head, ears, eyes, nose, and throat; cardiovascular;

respiratory; GI; ; musculoskeletal; neurologic; and skin is negative except as

mentioned in the HPI. 



PHYSICAL EXAMINATION:

VITAL SIGNS:  Afebrile, pulse 82, blood pressure 107/59, respirations 22, and

saturation 92% on 1.5 L nasal cannula. 

GENERAL:  The patient is awake and alert, in no apparent distress. 

LUNGS:  Good air entry bilaterally.  There are some rhonchi on the left.  No

prolonged expiratory phase or wheezing is appreciated. 

HEART:  Normal rate.  Regular. 

ABDOMEN:  Soft, nontender, and nondistended.  Bowel sounds are positive. 

MUSCULOSKELETAL:  No cyanosis or clubbing.  There is 2+ pitting in the bilateral

lower extremities. 

NEUROLOGIC:  Grossly nonfocal.



LABORATORY DATA:  WBC 12.3 and downtrending, hemoglobin 7.4, platelets 410,000,

neutrophil count is 95%, monocyte count and lymphocyte count are low.  Basic

metabolic profile is unremarkable.  Ferritin is 133.  Troponin is negative x3.  
BNP

57.  Liver function studies are unremarkable.  Lactate is normal.  Body fluid

culture recently had 76% neutrophils in 2/2.  There were no malignant cells

identified in the fluid.  Fungal cultures are currently pending, but are 
negative to

date.  The smears were without any fungus observed.  Legionella and strep urine

antigens are negative.  Previous respiratory virus panel was unremarkable.  AFB

smear and culture and routine cultures were negative on recent BAL. 



IMAGING STUDIES:  

1. Echocardiogram demonstrates normal ejection fraction.  Mild mitral 
regurgitation

is present.  No significant aortic pathology is present.  Mildly elevated 
pulmonary

artery pressures are present.  A little diastolic dysfunction is noted.

Pseudonormalization is present. 

2. CTA of the chest demonstrates no evidence of a pulmonary embolism.  That 
being

said, he has a dense infiltrate in the left upper lobe.  The pleural effusion 
on the

left is now taking on more of a loculated appearance.  Fairly extensive

emphysematous changes are present.  There is also an infiltrate in the right 
upper

lobe, which was not previously identified.  This is in the region of previously

known bronchiectasis. 



ASSESSMENT:  

1. Acute on chronic hypoxic respiratory failure.  

2. Healthcare-associated pneumonia in the left upper lobe.

3. COPD exacerbation.  

4. Pleural effusion on the left, taking more of a loculated appearance.

5. Obstructive sleep apnea.

6. Gastroesophageal reflux disease with large hiatal hernia.



DISCUSSION AND PLAN:  We will continue his empiric antibiotics for the time 
being.

At this time, we are going to have to give him aspiration related coverage for a

protracted course.  His effusion is already taking more of a loculated 
appearance.

I will look with an ultrasound, and if a nice pocket of fluid is identified,

thoracentesis will be performed.  He is a touch volume overloaded.  I will start

giving him doses of Lasix.  I will get a prescription for his CPAP out to a [facility]

company.  He is going to see me next week to get that arranged, but since he is 
in

the hospital, we will take care of it now.  He will need to follow up with

Pulmonology in the outpatient setting within 1 to 2 months with a download off 
his

new CPAP device.  At this point, the left upper lobe is essentially 
nonfunctional.

He has had a dense infiltrate in it and previously, took on appearance of 

abscess lesions.  A minimum 6 weeks of antibiotics will be initiated, but we 
will

likely need to extend that therapy out for a longer duration.  Community-
acquired

coverage is inadequate, as he will require coverage of healthcare associated

pathogens.  As such, antibiotic choice will be changed. 



70 minutes have been devoted to this patient in various activities.  I 
personally reviewed all imaging studies and laboratory data noted within this 
document.  For fifty percent of this time, I was interacting with the patient 
at the bedside or coordinating care with the care team.  For the remainder of 
the time I was immediately available to the patient in the hospital unit.  



Job ID:  [id number]



MTDD

## 2020-03-12 NOTE — PDOC.HOSPP
- Subjective


Encounter Date: 03/12/20


Encounter Time: 09:30


Subjective: 





Patient seen and examined. No new complaints. No overnight events, pt has 

improvement overall





- Objective


Vital Signs & Weight: 


 Vital Signs (12 hours)











  Temp Pulse Resp BP Pulse Ox


 


 03/12/20 10:59   68  20   90 L


 


 03/12/20 07:23  97.6 F  72  17  105/57 L  98


 


 03/12/20 07:16      92 L


 


 03/12/20 07:13   75  20   92 L


 


 03/12/20 03:10  98.6 F  76  18  108/55 L  92 L


 


 03/12/20 02:29      95


 


 03/12/20 02:28      95








 Weight











Weight                         156 lb














I&O: 


 











 03/11/20 03/12/20 03/13/20





 06:59 06:59 06:59


 


Intake Total  500 


 


Output Total  1000 


 


Balance  -500 











Result Diagrams: 


 03/12/20 03:55





 03/12/20 03:55


Radiology Reviewed by me: Yes


EKG Reviewed by me: Yes





Hospitalist ROS





- Review of Systems


Constitutional: reports: weakness


Eyes: denies: pain, vision change, conjunctivae inflammation, eyelid 

inflammation, redness, other


ENT: denies: ear pain, ear discharge, nose pain, nose discharge, nose congestion

, mouth pain, mouth swelling, throat pain, throat swelling, other


Respiratory: reports: cough, shortness of breath, SOB with excertion, sputum, 

wheezing.  denies: dry, hemoptysis, pleuritic pain, other


Cardiovascular: denies: chest pain, palpitations, orthopnea, paroxysmal noc. 

dyspnea, edema, light headedness, other


Gastrointestinal: denies: nausea, vomiting, abdominal pain, diarrhea, 

constipation, melena, hematochezia, other


Genitourinary: denies: dysuria, frequency, incontinence, hematuria, retention, 

other


Musculoskeletal: denies: neck pain, shoulder pain, arm pain, back pain, hand 

pain, leg pain, foot pain, other


Skin: denies: rash, lesions, iveth, bruising, other





- Medication


Medications: 


Active Medications











Generic Name Dose Route Start Last Admin





  Trade Name Freq  PRN Reason Stop Dose Admin


 


Albuterol/Ipratropium  3 ml  03/11/20 22:30  03/12/20 10:59





  Duoneb  NEB   3 ml





  [id number] SUSAN   Administration





     





     





     





     


 


Budesonide  0.5 mg  03/12/20 06:30  03/12/20 07:16





  Pulmicort Neb Solution  INH   0.5 mg





  BID-RT [facility]   Administration





     





     





     





     


 


Ezetimibe  10 mg  03/12/20 09:00  03/12/20 07:35





  Zetia  PO   10 mg





  DAILY [facility]   Administration





     





     





     





     


 


Enoxaparin Sodium  30 mg  03/12/20 09:00  03/12/20 07:39





  Lovenox  SC   30 mg





  0900 SUSAN   Administration





     





     





     





     


 


Ferrous Sulfate  325 mg  03/12/20 08:00  03/12/20 07:34





  Feosol  PO   325 mg





  BID-WM [facility]   Administration





     





     





     





     


 


Fluticasone Propionate  0 gm  03/12/20 09:00  03/12/20 07:38





  Flonase Nasal [place]  NASAL   Not Given





  DAILY [facility]   





     





     





     





     


 


Guaifenesin  1,200 mg  03/11/20 21:00  03/12/20 07:45





  Mucinex  PO   1,200 mg





  Q12HR [facility]   Administration





     





     





     





     


 


Methylprednisolone Sodium Succinate  40 mg  03/12/20 06:00  03/12/20 05:27





  Solu-Medrol  IVP   40 mg





  Q8HR [facility]   Administration





     





     





     





     


 


Multivitamins  1 tab  03/12/20 09:00  03/12/20 07:33





  Theragran  PO   1 tab





  DAILY [facility]   Administration





     





     





     





     


 


Pantoprazole Sodium  40 mg  03/12/20 09:00  03/12/20 07:35





  Protonix  PO   40 mg





  QAM [facility]   Administration





     





     





     





     


 


Propranolol HCl  20 mg  03/11/20 21:00  03/12/20 07:33





  Inderal  PO   20 mg





  BID [facility]   Administration





     





     





     





     


 


Rosuvastatin Calcium  5 mg  03/12/20 09:00  03/12/20 07:36





  Crestor  PO   5 mg





  QAM [facility]   Administration





     





     





     





     


 


Saccharomyces Boulardii  250 mg  03/12/20 09:00  03/12/20 07:37





  Florastor  PO   250 mg





  DAILY [facility]   Administration





     





     





     





     


 


Tamsulosin HCl  0.4 mg  03/11/20 21:00  03/12/20 07:37





  Flomax  PO   0.4 mg





  BID [facility]   Administration





     





     





     





     














- Exam


General Appearance: NAD, awake alert


Eye: PERRL, anicteric sclera


ENT: normocephalic atraumatic, no oropharyngeal lesions


Neck: supple, symmetric, no JVD


Heart: RRR, no murmur, no gallops, no rubs


Respiratory: rhonchi, wheezes


Gastrointestinal: soft, non-tender, non-distended, normal bowel sounds


Extremities: 1+ LE edema


Skin: normal turgor, no lesions


Neurological: no focal deficits


Musculoskeletal: normal tone, normal strength


Psychiatric: normal affect, normal behavior





Hosp A/P


(1) Acute respiratory failure with hypoxemia


Code(s): J96.01 - ACUTE RESPIRATORY FAILURE WITH HYPOXIA   Status: Acute   





(2) COPD exacerbation


Code(s): J44.1 - CHRONIC OBSTRUCTIVE PULMONARY DISEASE W (ACUTE) EXACERBATION   

Status: Acute   





(3) Edema of both legs


Code(s): R60.0 - LOCALIZED EDEMA   Status: Acute   





(4) Anemia


Code(s): D64.9 - ANEMIA, UNSPECIFIED   Status: Chronic   





(5) Pneumonia


Code(s): J18.9 - PNEUMONIA, UNSPECIFIED ORGANISM   Status: Acute   


Qualifiers: 


   Pneumonia type: due to other aerobic Gram-negative bacteria   Laterality: 

unspecified laterality   Lung location: unspecified part of lung   Qualified 

Code(s): J15.6 - Pneumonia due to other Gram-negative bacteria   





- Plan


old records reviewed/req, continue antibiotics, PT/OT, respiratory therapy





continue empiric antibiotic


continue optimum copd treatment


echo pending


result


start PT/OT


wean off oxygen as tolerated and assess for home oxygen


medication reviewed as above


symptomatic treatment


supportive care

## 2020-03-13 LAB
GLUCOSE PLR-MCNC: 169 MG/DL
LDH PLR L TO P-CCNC: 100 U/L
NEUTROPHILS NFR FLD: 41 %
NONHEMATIC CELLS NFR FLD MANUAL: 17 %
PROT PLR-MCNC: 3.2 G/DL
RBC # FLD AUTO: 793 /CUMM
WBC # FLD AUTO: 942 UL

## 2020-03-13 PROCEDURE — BB4BZZZ ULTRASONOGRAPHY OF PLEURA: ICD-10-PCS | Performed by: ORTHOPAEDIC SURGERY

## 2020-03-13 PROCEDURE — 0B9P30Z DRAINAGE OF LEFT PLEURA WITH DRAINAGE DEVICE, PERCUTANEOUS APPROACH: ICD-10-PCS | Performed by: ORTHOPAEDIC SURGERY

## 2020-03-13 RX ADMIN — MEROPENEM AND SODIUM CHLORIDE SCH MLS: 1 INJECTION, SOLUTION INTRAVENOUS at 06:36

## 2020-03-13 RX ADMIN — MEROPENEM AND SODIUM CHLORIDE SCH MLS: 1 INJECTION, SOLUTION INTRAVENOUS at 21:14

## 2020-03-13 RX ADMIN — DOCUSATE SODIUM 50 MG AND SENNOSIDES 8.6 MG PRN TAB: 8.6; 5 TABLET, FILM COATED ORAL at 09:49

## 2020-03-13 RX ADMIN — VANCOMYCIN HYDROCHLORIDE SCH MLS: 1 INJECTION, SOLUTION INTRAVENOUS at 18:58

## 2020-03-13 RX ADMIN — VANCOMYCIN HYDROCHLORIDE SCH MLS: 1 INJECTION, SOLUTION INTRAVENOUS at 05:30

## 2020-03-13 RX ADMIN — THERA TABS SCH TAB: TAB at 09:12

## 2020-03-13 RX ADMIN — Medication SCH ML: at 21:13

## 2020-03-13 RX ADMIN — MEROPENEM AND SODIUM CHLORIDE SCH MLS: 1 INJECTION, SOLUTION INTRAVENOUS at 14:49

## 2020-03-13 NOTE — PDOC.HOSPP
- Subjective


Encounter Date: 03/13/20


Encounter Time: 08:30


Subjective: 





Patient seen and examined. No new complaints. No overnight events





- Objective


Vital Signs & Weight: 


 Vital Signs (12 hours)











  Temp Pulse Resp BP BP Pulse Ox


 


 03/13/20 08:00  97.7 F  85  20   128/84  96


 


 03/13/20 07:34       92 L


 


 03/13/20 05:42       92 L


 


 03/13/20 04:00  97.3 F L  73  18  112/60   92 L


 


 03/13/20 03:31       92 L


 


 03/13/20 03:30  97.3 F L  73  18  112/60   92 L


 


 03/13/20 00:00  97.7 F  75  18  116/59 L   92 L


 


 03/12/20 23:47       92 L








 Weight











Weight                         156 lb 8 oz














I&O: 


 











 03/12/20 03/13/20 03/14/20





 06:59 06:59 06:59


 


Intake Total 500 2600 


 


Output Total 1000 1200 


 


Balance -500 1400 











Result Diagrams: 


 03/12/20 03:55





 03/12/20 03:55


EKG Reviewed by me: Yes





Hospitalist ROS





- Review of Systems


ENT: denies: ear pain, ear discharge, nose pain, nose discharge, nose congestion

, mouth pain, mouth swelling, throat pain, throat swelling, other


Respiratory: reports: cough, shortness of breath, hemoptysis, SOB with excertion

, sputum.  denies: dry, pleuritic pain, wheezing, other


Cardiovascular: denies: chest pain, palpitations, orthopnea, paroxysmal noc. 

dyspnea, edema, light headedness, other


Gastrointestinal: denies: nausea, vomiting, abdominal pain, diarrhea, 

constipation, melena, hematochezia, other


Genitourinary: denies: dysuria, frequency, incontinence, hematuria, retention, 

other


Musculoskeletal: denies: neck pain, shoulder pain, arm pain, back pain, hand 

pain, leg pain, foot pain, other





- Medication


Medications: 


Active Medications











Generic Name Dose Route Start Last Admin





  Trade Name Freq  PRN Reason Stop Dose Admin


 


Albuterol/Ipratropium  3 ml  03/11/20 22:30  03/13/20 05:42





  Duoneb  NEB   3 ml





  R2SR-CK SUSAN   Administration





     





     





     





     


 


Budesonide  0.5 mg  03/12/20 06:30  03/13/20 05:42





  Pulmicort Neb Solution  INH   0.5 mg





  BID-RT SUSAN   Administration





     





     





     





     


 


Ezetimibe  10 mg  03/12/20 09:00  03/13/20 09:12





  Zetia  PO   10 mg





  DAILY SUSAN   Administration





     





     





     





     


 


Enoxaparin Sodium  30 mg  03/12/20 09:00  03/13/20 09:14





  Lovenox  SC   Not Given





  0900 [facility]   





     





     





     





     


 


Ferrous Sulfate  325 mg  03/12/20 08:00  03/13/20 09:13





  Feosol  PO   Not Given





  BID-WM [facility]   





     





     





     





     


 


Fluticasone Propionate  0 gm  03/12/20 09:00  03/13/20 09:13





  Flonase Nasal [place]  NASAL   Not Given





  DAILY [facility]   





     





     





     





     


 


Furosemide  40 mg  03/13/20 09:30  03/13/20 09:50





  Lasix  PO  03/13/20 12:00  40 mg





  NOW SUSAN   Administration





     





     





     





     


 


Guaifenesin  1,200 mg  03/11/20 21:00  03/13/20 09:12





  Mucinex  PO   1,200 mg





  Q12HR SUSNA   Administration





     





     





     





     


 


Azithromycin 1,000 mg/ Sodium  500 mls @ 250 mls/hr  03/12/20 15:00  03/12/20 15

:34





  Chloride  IVPB   500 mls





  1500 SUSAN   Administration





     





     





     





     


 


Meropenem 1 gm/ Device  50 mls @ 100 mls/hr  03/12/20 22:00  03/13/20 06:36





  IVPB   50 mls





  Q8HR SUSAN   Administration





     





     





     





     


 


Vancomycin HCl 1 gm/ Device  200 mls @ 200 mls/hr  03/13/20 05:00  03/13/20 05:

30





  IVPB   200 mls





  0500,1700 SUSAN   Administration





     





     





     





     


 


Multivitamins  1 tab  03/12/20 09:00  03/13/20 09:12





  Theragran  PO   1 tab





  DAILY SUSAN   Administration





     





     





     





     


 


Pantoprazole Sodium  40 mg  03/12/20 09:00  03/13/20 09:12





  Protonix  PO   40 mg





  QAM SUSAN   Administration





     





     





     





     


 


Propranolol HCl  20 mg  03/11/20 21:00  03/13/20 09:12





  Inderal  PO   20 mg





  BID [facility]   Administration





     





     





     





     


 


Rosuvastatin Calcium  5 mg  03/12/20 09:00  03/13/20 09:12





  Crestor  PO   5 mg





  QAM SUSAN   Administration





     





     





     





     


 


Saccharomyces Boulardii  250 mg  03/12/20 09:00  03/13/20 09:12





  Florastor  PO   250 mg





  DAILY SUSAN   Administration





     





     





     





     


 


Senna/Docusate Sodium  2 tab  03/11/20 18:45  03/13/20 09:49





  Senokot S  PO   2 tab





  BIDPRN PRN   Administration





  Constipation   





     





     





     


 


Tamsulosin HCl  0.4 mg  03/11/20 21:00  03/13/20 09:12





  Flomax  PO   0.4 mg





  BID SUSAN   Administration





     





     





     





     














- Exam


General Appearance: NAD, awake alert


Eye: PERRL, anicteric sclera


ENT: normocephalic atraumatic, no oropharyngeal lesions


Neck: supple, symmetric, no JVD, no thyromegaly


Heart: RRR, no murmur, no gallops


Respiratory: rales, rhonchi


Gastrointestinal: soft, non-tender, non-distended, no palpable masses


Extremities: no cyanosis, no clubbing


Skin: normal turgor, no lesions


Neurological: no focal deficits


Musculoskeletal: normal tone, normal strength


Psychiatric: normal affect, normal behavior





Hosp A/P


(1) Acute respiratory failure with hypoxemia


Code(s): J96.01 - ACUTE RESPIRATORY FAILURE WITH HYPOXIA   Status: Acute   





(2) COPD exacerbation


Code(s): J44.1 - CHRONIC OBSTRUCTIVE PULMONARY DISEASE W (ACUTE) EXACERBATION   

Status: Acute   





(3) Edema of both legs


Code(s): R60.0 - LOCALIZED EDEMA   Status: Acute   





(4) Anemia


Code(s): D64.9 - ANEMIA, UNSPECIFIED   Status: Chronic   





(5) Pneumonia


Code(s): J18.9 - PNEUMONIA, UNSPECIFIED ORGANISM   Status: Acute   


Qualifiers: 


   Pneumonia type: due to other aerobic Gram-negative bacteria   Laterality: 

unspecified laterality   Lung location: unspecified part of lung   Qualified 

Code(s): J15.6 - Pneumonia due to other Gram-negative bacteria   





(6) Pleural effusion


Code(s): J90 - PLEURAL EFFUSION, NOT ELSEWHERE CLASSIFIED   Status: Acute   





- Plan


old records reviewed/req, continue antibiotics





continue empiric antibiotic


continue optimum copd treatment


echo pending


result


start PT/OT


wean off oxygen as tolerated and assess for home oxygen


medication reviewed as above


symptomatic treatment


supportive care








3/13/20


pulmonary recommendation appreciated


agree with current antibiotics


today thoracentesis


will need PICC line for long term antibiotic


ID consulted


continue respiratory therapy

## 2020-03-13 NOTE — PRG
DATE OF SERVICE:  03/13/2020



SERVICE:  Pulmonary Medicine.



INTERVAL HISTORY:  The patient is doing really well from respiratory standpoint.  He

denies any overnight events.  He is coughing, but having a challenging time clearing

the mucus.  Otherwise, there has been no interval change to his condition. 



PHYSICAL EXAMINATION:

VITAL SIGNS:  Afebrile, pulse 85, blood pressure 128/84, respirations 20, and

saturation 96% on 2 L nasal cannula. 

GENERAL:  The patient is awake and alert, in no apparent distress. 

LUNGS:  Decent air entry.  There is decreased air entry on the left.  No prolonged

expiratory phase or wheezing is appreciated. 

HEART:  Normal rate, regular. 

ABDOMEN:  Soft, nontender, and nondistended.  Bowel sounds are positive. 

MUSCULOSKELETAL:  There is 1 to 2+ pitting in bilateral lower extremities. 

NEUROLOGIC:  Grossly nonfocal.



LABORATORY DATA:  Strep urine antigen and Legionella antigen are negative.

Respiratory virus panel is negative.  Respiratory culture is growing gram-positive

cocci in pairs, chains, and clusters. 



IMAGING DATA:  Echocardiogram demonstrates normal ejection fraction with a normal

left atrium size and no evidence of valve stenosis.  Minimal mitral regurgitation is

noted.  Mildly elevated pulmonary artery pressures are present. 



ASSESSMENT:  

1. Acute hypoxic respiratory failure.

2. Healthcare-associated pneumonia, recurrent, likely secondary to severe aspiration

into the left upper lobe (the patient has severe acid reflux disease, and prefers to

lie on his left side while sleeping). 

3. Pleural effusion on the left, not otherwise specified.

4. Obstructive sleep apnea.

5. Gastroesophageal reflux disease with large hiatal hernia.



DISCUSSION AND PLAN:  We are working on fixing the acid reflux.  I have once again

advised him to elevate head of bed and avoid p.o. within 2 hours going to sleep.

The CT scan shows a very dense infiltrate in the left upper lobe, and some chronic

changes in the right upper lobe.  I will proceed with a thoracentesis on the left

for both diagnostic and therapeutic purposes.  The CT scan has some hypodense

lesions, and it is suggestive of either pulmonary abscesses or pleuritis.  As such,

the patient will likely require a longer course of antibiotics that will be directed

by the results for sputum culture.  Empiric antibiotics will be continued for the

time being.  ID consultation will be placed to assist in antibiotic selection and

duration, which I believe may be protracted.  Because the patient has had multiple

rounds of presentation, his bronchoscopy was essentially nondiagnostic, we may need

to consider suppressive antibiotics through time.  Pulmonary will continue to

follow, intermittently during this hospital stay. 







Job ID:  [id number]

## 2020-03-13 NOTE — OP
DATE OF PROCEDURE:  03/13/2020



SERVICE:  Pulmonary Medicine.



PROCEDURE PERFORMED:  Left-sided pleural drainage with catheter insertion under

ultrasound guidance. 



PREPROCEDURE DIAGNOSES:  

1. Acute hypoxic respiratory failure.

2. Pleural effusion, not otherwise specified.



POSTPROCEDURE DIAGNOSES:  

1. Acute hypoxic respiratory failure.

2. Pleural effusion, not otherwise specified.



DESCRIPTION OF PROCEDURE:  A time-out was performed identifying the correct

procedure and patient with name and date of birth.  The procedure site was marked.

Vital sign monitoring was accomplished by telemetry, and continuous pulse oximetry.

In the seated position, the left posterior back was evaluated in ultrasound.  The

pleural fluid was easily identified.  The skin was prepped and draped in sterile

fashion and anesthetized with 1% lidocaine.  A finder needle was inserted in the

pleural space with return of minimally opaque yellow fluid.  A pleural drainage

catheter was inserted in the same location.  A total quantity of 750 mL of pleural

fluid was withdrawn by syringe pump technique.  At the end of the procedure, the

fluid stopped coming, but the estimated pleural pressures, measured by manometry,

was -22 cm of water pressure.  The intact catheter was withdrawn on exhalation, and

a sterile dressing was applied.  The patient tolerated the procedure well without

any immediate complications other than transient pleuritic chest discomfort, which

was quite mild. 



ESTIMATED BLOOD LOSS:  None.



DISPOSITION:  The patient will remain in his hospital room.







Job ID:  [id number]

## 2020-03-14 LAB
ANION GAP SERPL CALC-SCNC: 11 MMOL/L (ref 10–20)
BUN SERPL-MCNC: 15 MG/DL (ref 8.4–25.7)
CALCIUM SERPL-MCNC: 8.7 MG/DL (ref 7.8–10.44)
CHLORIDE SERPL-SCNC: 102 MMOL/L (ref 98–107)
CO2 SERPL-SCNC: 26 MMOL/L (ref 23–31)
CREAT CL PREDICTED SERPL C-G-VRATE: 111 ML/MIN (ref 70–130)
GLUCOSE SERPL-MCNC: 104 MG/DL (ref 83–110)
HGB BLD-MCNC: 8.4 G/DL (ref 14–18)
MAGNESIUM SERPL-MCNC: 2.1 MG/DL (ref 1.6–2.6)
MCH RBC QN AUTO: 25.6 PG (ref 27–31)
MCV RBC AUTO: 82.3 FL (ref 78–98)
PLATELET # BLD AUTO: 505 THOU/UL (ref 130–400)
POTASSIUM SERPL-SCNC: 4 MMOL/L (ref 3.5–5.1)
RBC # BLD AUTO: 3.28 MILL/UL (ref 4.7–6.1)
SODIUM SERPL-SCNC: 135 MMOL/L (ref 136–145)
VANCOMYCIN TROUGH SERPL-MCNC: 12 UG/ML
WBC # BLD AUTO: 13.5 THOU/UL (ref 4.8–10.8)

## 2020-03-14 RX ADMIN — Medication SCH ML: at 20:45

## 2020-03-14 RX ADMIN — THERA TABS SCH TAB: TAB at 08:46

## 2020-03-14 RX ADMIN — Medication SCH ML: at 08:48

## 2020-03-14 RX ADMIN — VANCOMYCIN HYDROCHLORIDE SCH MLS: 1 INJECTION, SOLUTION INTRAVENOUS at 04:51

## 2020-03-14 RX ADMIN — MEROPENEM AND SODIUM CHLORIDE SCH MLS: 1 INJECTION, SOLUTION INTRAVENOUS at 05:57

## 2020-03-14 RX ADMIN — MEROPENEM AND SODIUM CHLORIDE SCH MLS: 1 INJECTION, SOLUTION INTRAVENOUS at 16:24

## 2020-03-14 NOTE — CON
DATE OF CONSULTATION:  03/14/2020



REASON FOR CONSULTATION:  Inflammatory process left lung.



HISTORY OF PRESENT ILLNESS:  A 74-year-old, who has a longstanding history of

chronic obstructive lung disease, mostly emphysema.  Some back issues with prior

surgery, who is followed by Dr. Lombardi.  In January, he developed worsening

dyspnea, came to the emergency room at [facility], did not 
have a

fever that documented then, his O2 saturations were 94 on room air, and he was 
not

tachycardic.  The exam showed wheezing, which was present diffusely on both 
sides.

The chest x-ray actually looks improved, but a CT of chest was recommended by 
Dr. Lombardi.  Most likely final diagnosis was possible pneumonia and COPD 
exacerbation,

who was discharged with oral prednisone and inhalers.  He continued to feel 
unwell

and with the same persistence of dyspnea and intermittent coughing spells.  Dr. Lombardi did a bronchoscopy on February 12.  The procedure report was reviewed.  
The

anatomy appeared normal to the segmental level, although there was significant

friability of the mucosal membranes in the right upper lobe and left upper lobes

with significant inflammatory process in the left upper lobe to the point that 
most

of the airways were friable and partially stenotic.  Samples were submitted

including bronchial washing cultures, which showed few normal respiratory roosevelt.

Direct acid-fast, which was negative with no growth to date.  The pathology of 
the

bronchial washings demonstrated benign epithelial cells with acute inflammatory

cells as well, but no evidence of malignant cells.  Due to persistence of 
shortness

of breath, the patient was admitted again on March 11.  He had some swelling in

lower extremities.  He had some sputum production as well, which was variably 
brown

or light depending on the time of the day.  Did not have any chest pain 
associated.

No abdominal pain with the usual lower back pain, but nothing different.  No

genitourinary symptoms.  No joint symptoms.  No neurological symptoms either. 



PAST MEDICAL HISTORY:  COPD, hyperlipidemia, chronic back pain, prior effusion,

appendectomy, tonsillectomy, hernia repair. 



SOCIAL HISTORY:  He drinks daily, but just about a drink a day.  He is retired.
  He

was .  Lived in [place] and other areas where there 
were lot

of refineries.  Also lived next to [employer].  He is  and has 
moved

to [place] a few years ago.  He lives in one-story house and does not have 
animals

in the property.  He has no travel history recently.  He quit smoking quite a 
few

years ago. 



ALLERGIES:  MORPHINE, NOT TRUE HYPERSENSITIVITY, BUT MOSTLY MENTAL STATE 
CHANGES.

PENICILLINS WITH RASH. 



MEDICATION LIST:  

1. Azithromycin.

2. Dulcolax.

3. Benadryl.

4. Zetia.

5. Lasix.

6. Imodium.

7. Claritin.

8. Meropenem.

9. Had been on levofloxacin prior to admission.

10. He is now also on prednisone and vancomycin.



PHYSICAL EXAMINATION:

VITAL SIGNS:  With T-max of 98, blood pressure 120/60, pulse 62, respirations 
12 to

16, and O2 saturation 95 on a 2 L nasal cannula. 

SKIN:  Shows peripheral IV access.  He is voiding in the toilet without 
difficulty.

No lymphadenopathy.  No other skin changes. 

HEENT:  Ocular movements conjugate.  Sclerae white.  Nasal passages patent.  Ear

exam normal.  Oral cavity with still quite a few teeth in place with marked 
decay

and gum disease.  Oral mucosa appears to be normal. 

NECK:  Supple.  No jugular vein distention or carotid bruits.  No thyromegaly. 

LUNGS:  With inspiratory crackles in the left side, mostly in the lower 
segments of

the left lung, somewhat coarse breath sounds in the left upper lobe.  The right 
lung

again the right lung has markedly diminished breath sounds throughout with no 
other

adventitious sounds. 

HEART:  S1 and S2 with soft aortic murmur.  Regular rate.  No S3. 

ABDOMEN:  Not distended or tender.  No ascites.  No bladder distention.  No

organomegaly noted. 

EXTREMITIES:  The pulses are 1+ popliteal left side, and faintly palpable 
popliteal

in the right.  Dorsalis pedis are faintly palpable in right and left side.  Cap

refill is somewhat delayed.  Onychodystrophy, possible onychomycosis noted.  
There

is a round shaped area in the skin of the medial right leg, which appears to be 
an

early blister, but that is not for sure.  He moves extremities equally.  Plantar

responses are flexor.  No clonus.  His cognitive function appears to be intact. 



LABORATORY DATA:  White cell count 14,000 and now is 13.5, hemoglobin 7.6, and

platelets 505 with 84% neutrophils.  Sodium 132 and creatinine 0.56.  Liver 
profile

normal.  Albumin 2.8.  BNP 57.  Pleural fluid with 942 wbc's with 41% 
neutrophils

and 42% lymphocytes.  The fluid is an exudate.  The LDH was 100.  Strep pneumo 
and

Legionella antigen in the urine negative and there is a fungal smear pending 
from

the fluid. 



ASSESSMENT:  Chronic obstructive pulmonary disease and new inflammatory process 
left

lung, which appears to be chronic developed over the past 6 weeks without 
apparent

culprit from the specimen submitted from BAL and thus far, the pleural fluid.  
This

is a patient, who has a significant history of exposure to smoking in the past 
and

chemical exposures.  He also has a history of histoplasmosis, which probably

diagnosed with calcifications noted in the x-ray. 



DISCUSSION:  The specimen submitted did not reveal any evidence of malignancy.

Autoimmune processes or vasculitides are unlikely due to the asymmetry of

presentation.  An infectious process is the more likely scenario.  We have had 
quite

a bit of AFB samples submitted and those are all negative, although the final 
culture

results are not yet posted, it will take a few more weeks for them to be 
finalized.

Other chronic infections to be considered include fungal infections, 
particularly

histoplasmosis, Cryptococcus neoformans, coccidioidomycosis, and blastomycosis.

Other mycobacterial infections would be expected to have been revealed in the

bronchoalveolar lavage, so that appears to be less likely, Nocardia species

infection also one would expect to have been retrieved from the sample 
submitted for

culture.  Non-infectious inflammatory processes, particularly 

 cryptogenic organizing pneumonia would be less likely.  We will go and submit 
assays for the 

above and may need a repeat bronchoscopy procedure for further sampling and 
repeat culture

depending on progress and above results. 





Job ID:  [id number]



MTDD

## 2020-03-14 NOTE — RAD
Portable frontal chest radiograph:

3/14/2020



COMPARISON: 3/11/2020



HISTORY: Pleural effusion



FINDINGS: There is hazy focal opacity in the right lung apex medially. There is hazy airspace disease
 throughout the left upper lobe with rounded opacity in the left perihilar region which may signify

a combination of consolidation and vascular prominence. There is dense pleural and parenchymal opacit
y in the left lung base. Appearance of the chest is stable when compared to the 3/11/2020

examination and much better assessed on the 3/11/2020 CT angiogram of the chest.



IMPRESSION: Stable appearance of the chest as detailed above.



Reported By: Chidi Warner 

Electronically Signed:  3/14/2020 5:27 PM

## 2020-03-14 NOTE — PDOC.HOSPP
- Subjective


Encounter Date: 03/14/20


Encounter Time: 16:41


Subjective: 


F/u : pleural effusion








Patient states he feels okay, but can't run a marathon just yet. Has some 

dyspnea on exertion, intermittent mild cough that is non productive. He had 

thoracentesis yesterday with 700 mL of fluid taken off. He feels improvement in 

his shortness of breath 








Per Dr. Lombardi, concern is that patient may have bullitis in [place] and wanted 

six weeks of antibiotics. Okay to switch to oral per pulm since cultures 

negative








- Objective


Vital Signs & Weight: 


 Vital Signs (12 hours)











  Temp Pulse Pulse Pulse Resp BP BP


 


 03/14/20 14:50  97.8 F  66    19  


 


 03/14/20 14:49   68    16  


 


 03/14/20 11:15   62    12  


 


 03/14/20 10:31    77  67   128/61  133/62


 


 03/14/20 08:44  97.8 F  77    20  


 


 03/14/20 06:54   62    16  














  BP Pulse Ox


 


 03/14/20 14:50  137/66  95


 


 03/14/20 14:49  


 


 03/14/20 11:15  


 


 03/14/20 10:31  


 


 03/14/20 08:44  98/57 L  95


 


 03/14/20 06:54  








 Weight











Weight                         161 lb 6.4 oz














I&O: 


 











 03/13/20 03/14/20 03/15/20





 06:59 06:59 06:59


 


Intake Total 2600 2650 


 


Output Total 1200 1600 


 


Balance 1400 1050 











Result Diagrams: 


 03/14/20 13:23





 03/14/20 04:30





Hospitalist ROS





- Review of Systems


Constitutional: denies: fever, chills


Respiratory: reports: cough (intermittent dry cough), shortness of breath





- Medication


Medications: 


Active Medications











Generic Name Dose Route Start Last Admin





  Trade Name Freq  PRN Reason Stop Dose Admin


 


Albuterol/Ipratropium  3 ml  03/11/20 22:30  03/14/20 14:49





  Duoneb  NEB   3 ml





  Q9EX-CQ SUSAN   Administration





     





     





     





     


 


Budesonide  0.5 mg  03/12/20 06:30  03/14/20 06:54





  Pulmicort Neb Solution  INH   0.5 mg





  BID-RT SUSAN   Administration





     





     





     





     


 


Ezetimibe  10 mg  03/12/20 09:00  03/14/20 08:47





  Zetia  PO   10 mg





  DAILY SUSAN   Administration





     





     





     





     


 


Enoxaparin Sodium  30 mg  03/12/20 09:00  03/14/20 08:47





  Lovenox  SC   Not Given





  0900 [facility]   





     





     





     





     


 


Fluticasone Propionate  0 gm  03/12/20 09:00  03/14/20 08:47





  Flonase Nasal [place]  NASAL   Not Given





  DAILY [facility]   





     





     





     





     


 


Furosemide  40 mg  03/14/20 07:30  03/14/20 08:46





  Lasix  PO   40 mg





  DAILY-AC SUSAN   Administration





     





     





     





     


 


Guaifenesin  1,200 mg  03/11/20 21:00  03/14/20 08:45





  Mucinex  PO   1,200 mg





  Q12HR SUSAN   Administration





     





     





     





     


 


Azithromycin 1,000 mg/ Sodium  500 mls @ 250 mls/hr  03/12/20 15:00  03/14/20 16

:24





  Chloride  IVPB   500 mls





  1500 SUSAN   Administration





     





     





     





     


 


Meropenem 1 gm/ Device  50 mls @ 100 mls/hr  03/12/20 22:00  03/14/20 16:24





  IVPB   50 mls





  Q8HR SUSAN   Administration





     





     





     





     


 


Vancomycin HCl 1 gm/ Device  200 mls @ 200 mls/hr  03/13/20 05:00  03/14/20 04:

51





  IVPB   200 mls





  0500,1700 SUSAN   Administration





     





     





     





     


 


Melatonin  6 mg  03/13/20 13:07  03/13/20 21:13





  Melatonin  PO   6 mg





  HS PRN   Administration





  Insomnia   





     





     





     


 


Multivitamins  1 tab  03/12/20 09:00  03/14/20 08:46





  Theragran  PO   1 tab





  DAILY SUSAN   Administration





     





     





     





     


 


Pantoprazole Sodium  40 mg  03/12/20 09:00  03/14/20 08:46





  Protonix  PO   40 mg





  QAM SUSAN   Administration





     





     





     





     


 


Prednisone  40 mg  03/14/20 08:00  03/14/20 08:45





  Prednisone  PO  03/16/20 08:01  40 mg





  QAM-WM SUSAN   Administration





     





     





     





     


 


Propranolol HCl  20 mg  03/11/20 21:00  03/14/20 08:46





  Inderal  PO   20 mg





  BID SUSAN   Administration





     





     





     





     


 


Rosuvastatin Calcium  5 mg  03/12/20 09:00  03/14/20 08:46





  Crestor  PO   5 mg





  QAM SUSAN   Administration





     





     





     





     


 


Saccharomyces Boulardii  250 mg  03/12/20 09:00  03/14/20 08:47





  Florastor  PO   250 mg





  DAILY USSAN   Administration





     





     





     





     


 


Senna/Docusate Sodium  2 tab  03/11/20 18:45  03/13/20 09:49





  Senokot S  PO   2 tab





  BIDPRN PRN   Administration





  Constipation   





     





     





     


 


Sodium Chloride  10 ml  03/13/20 21:00  03/14/20 08:48





  Flush - Normal Saline  IVF   10 ml





  Q12HR SUSAN   Administration





     





     





     





     


 


Sodium Chloride  10 ml  03/13/20 09:36  03/14/20 04:52





  Flush - Normal Saline  IVF   10 ml





  PRN PRN   Administration





  Saline Flush   





     





     





     


 


Tamsulosin HCl  0.4 mg  03/11/20 21:00  03/14/20 08:45





  Flomax  PO   0.4 mg





  BID SUSAN   Administration





     





     





     





     














- Exam


General Appearance: NAD, awake alert


Eye: PERRL, anicteric sclera


ENT: normocephalic atraumatic, no oropharyngeal lesions


Neck: no JVD


Heart: RRR, no murmur, no gallops, no rubs


Respiratory: no rales, no ronchi


Respiratory - other findings: slightly diminished breath sounds lower lobe. 


Gastrointestinal: soft, non-tender, non-distended, normal bowel sounds, no 

palpable masses


Extremities: no cyanosis, no clubbing, no edema





Hosp A/P





- Plan


ECHO: EF 55-60%, mild MR


CTA: consolidation + bronchiectasis RUL, mediastinal LAD, left pleural effusion





This is a 74 year old male with past medical history of  COPD who presented 

with increasing SOB, found to have pleural effusion, s/p thoracentesis











#Acute hypoxic respiratory failure secondary to pleural effusion


#COPD


- s/p thoracentesis, meets exudative criteria with LDH and protein. Sputum 

culture normal, body fluid culture preliminarily negative. AFB negative 


- was on vanc, meropenem and azithromycin. Will switch to levaquin and flagyl 

for total of four weeks 


- will repeat chest X ray


- attempt to wean off oxygen


- continue lasix 40 mg daily


- continue inhaled steroid 








#Anemia


- Hb 8.4. Occult blood positive


- iron panel shows low iron sat of 8%, normal ferritin 


- consider  outpatient colonoscopy








#BPH - continue flomax 





#Hypertension- continue propranolol 








DVT prophylaxis: lovenox 


Code status: full code

## 2020-03-14 NOTE — PRG
DATE OF SERVICE:  03/14/2020



SUBJECTIVE:  Natanael Juarez says he is feeling a little better.  Says he had a black

stool last night. 



OBJECTIVE:  VITAL SIGNS:  He is afebrile, heart rate is in the 60s, respiratory rate

is in the teens, oximetry is 95%, blood pressure 137/66. 

LUNGS:  Remarkable for decreased breath sounds on the left. 

HEART:  Regular rhythm. 

ABDOMEN:  Soft.



LABORATORY DATA:  White count 13.5, hemoglobin is 8.4, hemoglobin is 7.4 two days

ago.  Electrolytes are normal.  Creatinine is 0.5.  Pleural fluid cultures are

negative.  Protein was 3.2.  LDH was 100.  Glucose is 169.  942 white cells and 793

red cells. 



IMPRESSION:  ? Parapneumonic effusion, that was sterile, clinically stable.  We will

continue to follow. 







Job ID:  [id number]

## 2020-03-14 NOTE — EKG
Test Reason : 

Blood Pressure : ***/*** mmHG

Vent. Rate : 076 BPM     Atrial Rate : 076 BPM

   P-R Int : 152 ms          QRS Dur : 090 ms

    QT Int : 380 ms       P-R-T Axes : 048 -27 014 degrees

   QTc Int : 427 ms

 

Normal sinus rhythm

Normal ECG

 

Confirmed by AUSTEN MESSINA M.D. ([id number]),  DEXTER DIALLO ([id number]) on 3/14/2020 3:36:20 PM

 

Referred By:             Confirmed By:AUSTEN MESSINA M.D.

## 2020-03-15 RX ADMIN — Medication SCH ML: at 08:55

## 2020-03-15 RX ADMIN — Medication SCH ML: at 20:49

## 2020-03-15 RX ADMIN — THERA TABS SCH TAB: TAB at 08:54

## 2020-03-15 NOTE — PDOC.HOSPP
- Subjective


Encounter Date: 03/15/20


Encounter Time: 11:00


Subjective: 


CC: pleural effusion 





 The patient is still coughing up some productive phlegm. No chest pain or 

shortness of breath. No fevers





- Objective


Vital Signs & Weight: 


 Vital Signs (12 hours)











  Temp Pulse Resp BP Pulse Ox


 


 03/15/20 16:00  98.7 F  72  20  138/79  93 L


 


 03/15/20 15:47   84  16  


 


 03/15/20 12:00  98.9 F  62  20  127/67  95


 


 03/15/20 10:58   81  16  








 Weight











Weight                         161 lb 6.4 oz














I&O: 


 











 03/14/20 03/15/20 03/16/20





 06:59 06:59 06:59


 


Intake Total 2650 1670 960


 


Output Total 1600 4217 1280


 


Balance 4659 -850 -485











Result Diagrams: 


 03/14/20 13:23





 03/14/20 04:30





Hospitalist ROS





- Medication


Medications: 


Active Medications











Generic Name Dose Route Start Last Admin





  Trade Name Freq  PRN Reason Stop Dose Admin


 


Acetaminophen  650 mg  03/11/20 18:45  03/15/20 14:09





  Tylenol  PO   650 mg





  Q4H PRN   Administration





  Headache/Fever/Mild Pain (1-3)   





     





     





     


 


Albuterol/Ipratropium  3 ml  03/11/20 22:30  03/15/20 15:47





  Duoneb  NEB   3 ml





  Y5ON-FE SUSAN   Administration





     





     





     





     


 


Budesonide  0.5 mg  03/12/20 06:30  03/15/20 06:58





  Pulmicort Neb Solution  INH   0.5 mg





  BID-RT SUSAN   Administration





     





     





     





     


 


Ezetimibe  10 mg  03/12/20 09:00  03/15/20 08:54





  Zetia  PO   10 mg





  DAILY SUSAN   Administration





     





     





     





     


 


Enoxaparin Sodium  30 mg  03/12/20 09:00  03/15/20 08:56





  Lovenox  SC   Not Given





  0900 SUSAN   





     





     





     





     


 


Fluticasone Propionate  0 gm  03/12/20 09:00  03/15/20 08:56





  Flonase Nasal [place]  NASAL   Not Given





  DAILY SUSAN   





     





     





     





     


 


Furosemide  40 mg  03/14/20 07:30  03/15/20 08:54





  Lasix  PO   40 mg





  DAILY-AC SUSAN   Administration





     





     





     





     


 


Guaifenesin  1,200 mg  03/11/20 21:00  03/15/20 08:54





  Mucinex  PO   1,200 mg





  Q12HR SUSAN   Administration





     





     





     





     


 


Levofloxacin  750 mg  03/14/20 20:00  03/14/20 20:44





  Levaquin  PO   750 mg





  2000 SUSAN   Administration





     





     





     





     


 


Melatonin  6 mg  03/13/20 13:07  03/14/20 20:46





  Melatonin  PO   6 mg





  HS PRN   Administration





  Insomnia   





     





     





     


 


Metronidazole  500 mg  03/14/20 21:00  03/15/20 14:11





  Flagyl  PO   500 mg





  TID SUSAN   Administration





     





     





     





     


 


Multivitamins  1 tab  03/12/20 09:00  03/15/20 08:54





  Theragran  PO   1 tab





  DAILY SUSAN   Administration





     





     





     





     


 


Pantoprazole Sodium  40 mg  03/12/20 09:00  03/15/20 08:55





  Protonix  PO   40 mg





  QAM SUSAN   Administration





     





     





     





     


 


Prednisone  40 mg  03/14/20 08:00  03/15/20 08:55





  Prednisone  PO  03/16/20 08:01  40 mg





  QAM-WM SUSAN   Administration





     





     





     





     


 


Propranolol HCl  20 mg  03/11/20 21:00  03/15/20 08:55





  Inderal  PO   20 mg





  BID SUSAN   Administration





     





     





     





     


 


Rosuvastatin Calcium  5 mg  03/12/20 09:00  03/15/20 08:54





  Crestor  PO   5 mg





  QAM SUSAN   Administration





     





     





     





     


 


Saccharomyces Boulardii  250 mg  03/12/20 09:00  03/15/20 08:54





  Florastor  PO   250 mg





  DAILY SUSAN   Administration





     





     





     





     


 


Senna/Docusate Sodium  2 tab  03/11/20 18:45  03/13/20 09:49





  Senokot S  PO   2 tab





  BIDPRN PRN   Administration





  Constipation   





     





     





     


 


Sodium Chloride  10 ml  03/13/20 21:00  03/15/20 08:55





  Flush - Normal Saline  IVF   10 ml





  Q12HR SUSAN   Administration





     





     





     





     


 


Sodium Chloride  10 ml  03/13/20 09:36  03/14/20 04:52





  Flush - Normal Saline  IVF   10 ml





  PRN PRN   Administration





  Saline Flush   





     





     





     


 


Tamsulosin HCl  0.4 mg  03/11/20 21:00  03/15/20 08:54





  Flomax  PO   0.4 mg





  BID SUSAN   Administration





     





     





     





     














- Exam


General Appearance: NAD, awake alert


Eye: PERRL, anicteric sclera


ENT: normocephalic atraumatic, no oropharyngeal lesions


Neck: supple, no JVD


Heart: RRR, no murmur, no gallops, no rubs


Respiratory: CTAB, no wheezes, no ronchi


Respiratory - other findings: decreased breath sounds on the left side 


Gastrointestinal: soft, non-tender, non-distended, normal bowel sounds, no 

hepatomegaly


Extremities: no cyanosis, no clubbing, no edema


Skin: normal turgor, no lesions, no rashes





Hosp A/P





- Plan


ECHO: EF 55-60%, mild MR


CTA: consolidation + bronchiectasis RUL, mediastinal LAD, left pleural effusion





This is a 74 year old male with past medical history of  COPD who presented 

with increasing SOB, found to have pleural effusion, s/p thoracentesis











#Acute hypoxic respiratory failure secondary to pleural effusion


#COPD


- s/p thoracentesis, meets exudative criteria with LDH and protein. Sputum 

culture normal, body fluid culture preliminarily negative. AFB negative 


- was on vanc, meropenem and azithromycin. Will switch to levaquin and flagyl 

for total of four weeks per Dr. Lombardi. Repeat chest X ray shows significant 

pleural effusion still on the left side


- will ask pulmonary about whether repeat thoracentesis will help? 


- - attempt to wean off oxygen. Home oxygen evaluation 


- continue lasix 40 mg daily


- continue inhaled steroid 








#Anemia


- Hb 8.4. Occult blood positive. Recheck CBC tomorrow


- iron panel shows low iron sat of 8%, normal ferritin 


- consider  outpatient colonoscopy








#BPH - continue flomax 





#Hypertension- continue propranolol 








DVT prophylaxis: lovenox 


Code status: full code

## 2020-03-16 VITALS — SYSTOLIC BLOOD PRESSURE: 142 MMHG | TEMPERATURE: 97.6 F | DIASTOLIC BLOOD PRESSURE: 90 MMHG

## 2020-03-16 LAB
ANION GAP SERPL CALC-SCNC: 10 MMOL/L (ref 10–20)
BUN SERPL-MCNC: 14 MG/DL (ref 8.4–25.7)
CALCIUM SERPL-MCNC: 8.6 MG/DL (ref 7.8–10.44)
CHLORIDE SERPL-SCNC: 98 MMOL/L (ref 98–107)
CO2 SERPL-SCNC: 25 MMOL/L (ref 23–31)
CREAT CL PREDICTED SERPL C-G-VRATE: 103 ML/MIN (ref 70–130)
GLUCOSE SERPL-MCNC: 95 MG/DL (ref 83–110)
HGB BLD-MCNC: 8.2 G/DL (ref 14–18)
MCH RBC QN AUTO: 26.3 PG (ref 27–31)
MCV RBC AUTO: 81.8 FL (ref 78–98)
PLATELET # BLD AUTO: 489 THOU/UL (ref 130–400)
POTASSIUM SERPL-SCNC: 3.7 MMOL/L (ref 3.5–5.1)
RBC # BLD AUTO: 3.13 MILL/UL (ref 4.7–6.1)
SODIUM SERPL-SCNC: 129 MMOL/L (ref 136–145)
WBC # BLD AUTO: 11.8 THOU/UL (ref 4.8–10.8)

## 2020-03-16 RX ADMIN — Medication SCH ML: at 08:12

## 2020-03-16 RX ADMIN — THERA TABS SCH TAB: TAB at 08:07

## 2020-03-16 NOTE — DIS
DATE OF ADMISSION:  03/11/2020



DATE OF DISCHARGE:  03/16/2020



DISCHARGE DIAGNOSES: 

1. Acute hypoxic respiratory failure, possibly secondary to

parapneumonic pleural effusion versus bullitis versus possible microabscesses,

anemia

2. Chronic obstructive pulmonary disease

3. Leukocytosis

4. Hyponatremia



CONSULTATIONS:  Pulmonary, Dr. Philippe Lombardi.



PROCEDURES:  Thoracentesis on 03/13.



BRIEF HISTORY OF PRESENT ILLNESS:  This is a 74-year-old male with a past 
medical

history of COPD, who had presented to the hospital with increasing shortness of

breath.  The patient had a recent bronchoscopy done on 02/20, which was 
negative for

any malignancy.  Upon arrival to the emergency room, had an O2 saturation of 87
% on

room air.  His chest x-ray showed a large left-sided pleural effusion.  CTA of 
his

thorax showed no evidence of PE, but showed extensive consolidation in the left

upper lobe and increasing size of the left pleural effusion.  The patient was

started on antibiotics initially with ceftriaxone and azithromycin.  





Acute hypoxic respiratory failure secondary to parapneumonic effusion versus 
bullitis: 

RONNIE: 

Pulmonary was consulted and antibiotics were switched to vancomycin and 
meropenem to cover for hospital acquired organisms. The patient underwent 
thoracentesis on 03/13 with 700 mL of fluid drained.   Per Light's criteria, 
patient met exudative criteria. The patient's pleural fluid cultures were 
negative for any organisms including tuberculosis or cryptococcus. Pleural 
cytolog was negative for malignancy. Sputum culture was negative, urine 
legionella and strep were normal. .  Repeat chest x-ray on the 14th showed 
airspace diseasethroughout the left upper lobe and focal opacity in the right 
lung apex. ECHO was normal with  an EF of 55% to 60%.  Pulmonary felt the 
patient may have bullitis or microabscesses requiring prolonged course of 
antibiotics. The patient was weaned off oxygen and switched to oral levaquin 
and flagyl due to penicillin allergy (hives per patient).  He will need four 
weeks of antibiotics.  He was discharged on probiotics. He will also need to 
follow up with Dr. Lombardi for setting up of auto-titrating CPAP. 



COPD:  The patient was resumed on his home medications and was prescribed an 
inhaled

steroid. 



Anemia:  The patient had a hemoglobin around the 8 range.  He had a low iron

saturation of 88% and normal ferritin.  He should consider outpatient 
colonoscopy. 



RONNIE:  Pulmonary recommended the patient start on autotitrating CPAP machine.  He

will need to follow up in the clinic in 2 months. 



Hyponatremia:  The patient's sodium had dropped to 129 on the day of discharge.
  He

is advised to discontinue his Lasix and only take it as needed if he has leg

swelling or chest congestion. 



DISCHARGE PHYSICAL EXAMINATION: 

 VITAL SIGNS:  Temperature 97.6, heart rate 82,

respiratory rate 16, O2 saturation 94% on room air, blood pressure 142/90. 

GENERAL:  The patient is sitting up in bed.  He is in no acute distress. 

CVS:  Regular rate and rhythm with no murmurs, rubs, or gallops. 

LUNGS:  Slightly diminished breath sounds on the left side.  No wheezing, rales
, or

rhonchi. 

MUSCULOSKELETAL:  The patient does have some paraspinal tenderness around his 
back. 

ABDOMEN:  Positive bowel sounds, soft, nontender, nondistended. 

EXTREMITIES:  No edema.



PERTINENT LABORATORY DATA:  

CBC on 03/16: white count 11.8, hemoglobin 8.2,

hematocrit 25.6, platelet count 49. 

BMP on 03/16: sodium 129, which decreased from 135 on the 14th. 

Iron panel; iron of 8, TIBC of 174, ferritin 133.86. 

LFTs; AST 31, ALT 39, alkaline phosphatase 91. 

Troponin I :0.010, 0.019, 0.016. 



Pleural fluid on 03/13: shows yellow and hazy fluid, pH was 7.6, fluid white 
blood

cell 942, fluid rbc 793, 41% segmented neutrophils.  Pathology shows a few 
reactive

appearing mesothelioma cells.  Pleural fluid protein was 3.2, , glucose 
169. 

Serology on 03/11: negative for Legionella and strep.  Fungal smear is pending. 

Pleural fluid cytology on 03/13:shows lymphocytes, neutrophils and mesothelial

cells.  No malignant cells identified. 



PERTINENT IMAGING:  

Chest x-ray on 03/11: enlarging left layering pleural

effusion.  Progressive loss of aeration of the lingula in the left upper lobe. 



CTA thorax on 3/11: no evidence of PE.  There is mediastinal lymphadenopathy.

Interval increase in the size of the left pleural effusion and consolidation of 
the

left upper lobe.  There are consolidation and bronchiectatic changes in the 
right

upper lobe.  Emphysematous changes in the lung fields bilaterally. 



Chest x-ray on 03/14: stable appearance of the chest with focal hazy opacity in 
the

right lung and hazy air space disease throughout the left upper lobe with 
rounded

opacity in the left perihilar region, which may represent consolidation and 
vascular

prominence.  There is dense pleural and parenchymal opacity in the left lung 
base. 



Echo on 03/12: EF 55% to 60%.  Mild MR.  Mild TR.



DISCHARGE CONDITION:  Stable.



DISPOSITION:  Home.  The patient was given a prescription for outpatient 
physical

therapy for his back pain. 

ACTIVITY:  As tolerated.

DIET:  Heart healthy diet.



DISCHARGE MEDICATIONS: 

 New prescriptions:  Pulmicort neb 0.5 mg inhaled b.i.d.,

Levaquin 750 mg p.o. daily, metronidazole 500 mg p.o. t.i.d., Florastor 250 mg 
p.o.

daily. 

Discontinued medications:  Lasix 40 mg p.o. daily.  He was advised to take this 
only

as needed for leg swelling and chest congestion. 





DISCHARGE INSTRUCTIONS:  The patient should follow up with Dr. Lombardi in 4 
weeks.

He should have his BMP rechecked in 3 days for re-evaluation of his 
hyponatremia.

He will follow up with [facility] for physical therapy. 







Job ID:  [id number]



MTDD

## 2020-03-16 NOTE — PRG
DATE OF SERVICE:  03/16/2020



SERVICE:  Pulmonary Medicine.



INTERVAL HISTORY:  The patient is doing really well from respiratory standpoint.  He

is still on 1 to 2 L nasal cannula.  He denies any current nausea, vomiting, or

diarrhea.  He is coughing, but not bringing up any phlegm.  Otherwise, he feels like

he is getting closer to his usual state of health. 



PHYSICAL EXAMINATION:

VITAL SIGNS:  Afebrile.  Pulse 86, respirations 15, saturation 95% on 2 L nasal

cannula.  Blood pressure 142/75. 

HEENT:  Normocephalic and atraumatic.  Sclerae are white.  Conjunctivae are pink.

Oral mucosa is moist without lesions. 

LUNGS:  Decent air entry.  Rhonchi and crackles are both present.  No prolonged

expiratory phase or wheezing is present. 

HEART:  Normal rate, regular. 

ABDOMEN:  Soft, nontender, and nondistended.  Bowel sounds are positive. 

MUSCULOSKELETAL:  No cyanosis or clubbing.  There is trace 1+ pitting in the

bilateral lower extremities. 

NEUROLOGIC:  Grossly nonfocal.



LABORATORY DATA:  Sodium 129, but otherwise basic metabolic profile is unremarkable.

 WBC is downtrending at 11.8, hemoglobin 8.2 and downtrending, and platelets

489,000.  Left-sided pleural parenchymal disease has improved.  He continues to have

a persistent left upper lobe infiltrate, as well as a right upper lobe infiltrate.

The right upper lobe infiltrate appears to be slightly improved. 



ASSESSMENT:  

1. Acute on chronic hypoxic respiratory failure.

2. Chronic obstructive pulmonary disease with acute exacerbation.

3. Healthcare-associated pneumonia.

4. Aspiration related lung disease, chronic.

5. Obstructive sleep apnea, moderate, requiring CPAP moving forward.



DISCUSSION AND PLAN:  I will initiate the patient on autotitrating CPAP machine.  We

will end up bringing him back to clinic in 2 months with a download off his CPAP

machine.  That being said, he will need to return to clinic to see me in 4 weeks to

see whether or not antibiotic continuation is indicated.  Either way, we need to

elevate head of bed and avoid p.o. within 2 hours of going to sleep for life.  He

will need to work with Speech Pathology to minimize aspiration related changes

through time.  He understands that strengthening and conditioning are going to be

paramount to that endeavor. 







Job ID:  [id number]

## 2020-03-18 NOTE — PQF
MARCELARANDY ALBERT                                            CATARINO DUKE

[id number]                                                             [facility]

[id number]                             

                                   

CLINICAL DOCUMENTATION CLARIFICATION FORM:  POST DISCHARGE



Addendum to original discharge summary date:  __________________________________
____



Late entry note date:  _________________________________________________________
__







DATE: 03/18/2020                                                               
    ATTN:CATARINO DUKE





Please exercise your independent, professional judgment in responding to the 
clarification form. 

Clinical indicators are provided on the bottom of this form for your review



Please check appropriate box(s):



[ X ] Aspiration Pneumonia



[  ] Healthcare-associated pneumonia 



[  ] Other diagnosis ___________



[  ] Unable to determine





For continuity of documentation, please document condition throughout progress 
notes and discharge summary.  Thank You.





CLINICAL INDICATORS - SIGNS / SYMPTOMS / LABS

-  Healthcare-associated pneumonia -Progress note, 03/16, Maria C Paez MD

-  Aspiration related lung disease, chronic- -Progress note, 03/16, Maria C Paez MD

- Lung pleural effusion with suspected pneumonia-H&P, 03/12, Dana Lentz

- Bilateral end-expiratory wheezing heard,air entry reduced-H&P, 03/12, Dana Lentz

- Pneumonia due to other gram negative bacteria- Hospital PN, 03/12, Dana Lentz



RISK FACTORS

- COPD with acute exacerbation--Progress note, 03/16, Maria C Paez MD

- Acute on chronic hypoxic respiratory failure- -Progress note, 03/16Maria C MD



TREATMENTS:

- Rocephin.IV- MAR, 03/11

- Azithromycin.IV-MAR, 03/11





(This form is maintained as a part of the permanent medical record)

2015 [employer].  All Rights Reserved

[email or web address]    [phone]



MADAI

## 2020-04-07 ENCOUNTER — HOSPITAL ENCOUNTER (OUTPATIENT)
Dept: HOSPITAL 18 - NAV LAB | Age: 75
Discharge: HOME | End: 2020-04-07
Attending: INTERNAL MEDICINE
Payer: MEDICARE

## 2020-04-07 DIAGNOSIS — M54.5: Primary | ICD-10-CM

## 2020-04-07 PROCEDURE — 72100 X-RAY EXAM L-S SPINE 2/3 VWS: CPT

## 2020-04-08 NOTE — RAD
LUMBAR SPINE 4 VIEWS:

 

Date:  04/07/2020

 

HISTORY:  

Back pain. 

 

No comparison. 

 

FINDINGS:

Severe degenerative changes. Large bridging osteophytes are seen in the lower thoracic spine with wed
ge compression deformities noted at T11 and T12. 

 

There is a compression deformity involving the L3 vertebra with loss of central and anterior height o
f over 50%. Posterior height is preserved and posterior alignment is maintained. 

 

Postoperative changes are noted. There are pedicle screws at L2, L4, and L5 levels. There is loss of 
height at L5 and there is loss of disc space at L5-S1. 

 

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 Posterior laminectomy ch
camille in the lower lumbar spine at the site of pedicle screws. Aortic stent graft is noted. 

 

IMPRESSION: 

Severe degenerative changes. Compression deformities are multiple levels with postoperative changes a
s described. 

 

 

POS: [facility]